# Patient Record
Sex: FEMALE | Race: WHITE | NOT HISPANIC OR LATINO | Employment: FULL TIME | URBAN - METROPOLITAN AREA
[De-identification: names, ages, dates, MRNs, and addresses within clinical notes are randomized per-mention and may not be internally consistent; named-entity substitution may affect disease eponyms.]

---

## 2017-01-04 ENCOUNTER — ROUTINE PRENATAL (OUTPATIENT)
Dept: OBGYN | Facility: CLINIC | Age: 27
End: 2017-01-04
Payer: MEDICAID

## 2017-01-04 VITALS — BODY MASS INDEX: 19.38 KG/M2 | SYSTOLIC BLOOD PRESSURE: 100 MMHG | DIASTOLIC BLOOD PRESSURE: 60 MMHG | WEIGHT: 106 LBS

## 2017-01-04 DIAGNOSIS — Z34.80 SUPERVISION OF OTHER NORMAL PREGNANCY, ANTEPARTUM: ICD-10-CM

## 2017-01-04 PROCEDURE — 90040 PR PRENATAL FOLLOW UP: CPT | Performed by: NURSE PRACTITIONER

## 2017-01-04 NOTE — PROGRESS NOTES
OB f/u. + fetal movement.  No VB, LOF or UC's.  Wt: 106lb      BP: 100/60  Good phone # [phone]  Preferred pharmacy confirmed.  US scheduled 1/10/17  PNP, AFP done

## 2017-01-04 NOTE — MR AVS SNAPSHOT
Rosalva Avila Slick   2017 1:15 PM   Routine Prenatal   MRN: [id number]    Department:  [facility]   Dept Phone:  [phone]    Description:  Female : 1990   Provider:  Natty Dickens D.N.P.           Allergies as of 2017     No Known Allergies      Vital Signs     Blood Pressure Weight Last Menstrual Period Smoking Status          100/60 mmHg 48.081 kg (106 lb) 08/15/2016 Never Smoker         Basic Information     Date Of Birth Sex Race Ethnicity Preferred Language    1990 Female White Non- English      Your appointments     Linden 10, 2017 11:00 AM   US PREG 60 with [address] US 1   [facility] IMAGING - ULTRASOUND - [address]    [address]  [place] [id number] [phone]           For [facility] Pregnancy Center patients only: 1. Please arrive 15 min prior to your appointment time. 2. If you're late, you will be rescheduled for the next available appointment. 3. If you need to reschedule your appointment, please call us at [phone] 48 hours prior to your appointment. 4. Do not bring children as they will not be allowed in exam room. 5. Only one family member may be present in room during exam. 6. The exam will be 30-60 minutes depending on the exam ordered by the physician. 7. The sonographer is not allowed to discuss findings during the exam. Your provider will go over the results with you at your next appointment. 8. The purpose of this ultrasound is to determine if baby is healthy. Diagnostic ultrasounds are NOT to determine the gender of the baby. 9. NO photography or video recording is allowed in exam room. 10. NO cell phones allowed in the exam room. INFORMACION SOBRE PEREA ULTRASONIDO 1. Por favor de llegar 15 minutos antes de perea rajiv. 2. Si llega tarde, le tenemos que cambiar la rajiv para otra fecha. 3. Si necesita cambiar perea rajiv, por favor llame 48 horas antes de la rajiv. [phone] 4. Por favor no traer niños. No se permiten en cuarto de  Ultrasonido. 5. Solamente se permite gil persona en el cuarto zhao el examen. 6. El examen dura 30-60 minutos, dependiendo del examen ordenado por el Doctor. 7. El Sonógrafo no está autorizado hablar sobre benson examen. Benson doctor o partera le va explicar los resultados en benson próxima rajiv. 8. El propósito del Ultrasonido es para determinar si benson ermias viene saludable. No es para determinar el sexo de benson ermias. 9. Por favor no fotos o cámaras de grabar. 10. No celulares permitidos en el cuarto de examen.              Problem List              ICD-10-CM Priority Class Noted - Resolved    Supervision of other normal pregnancy, antepartum Z34.80   11/9/2016 - Present      Health Maintenance        Date Due Completion Dates    IMM HEP B VACCINE (1 of 3 - Primary Series) 1990 ---    IMM HEP A VACCINE (1 of 2 - Standard Series) 11/21/1993 ---    IMM HPV VACCINE (1 of 3 - Female 3 Dose Series) 11/21/2003 ---    IMM VARICELLA (CHICKENPOX) VACCINE (1 of 2 - 2 Dose Adolescent Series) 11/21/2005 ---    IMM INFLUENZA (1) 9/1/2016 11/4/2013    PAP SMEAR 11/9/2019 11/9/2016    IMM DTaP/Tdap/Td Vaccine (2 - Td) 11/4/2023 11/4/2013            Current Immunizations     Influenza Vaccine Quad Inj (Pf) 11/4/2013 11:30 AM    Tdap Vaccine 11/4/2013 11:30 AM    Tuberculin Skin Test 3/21/2016      Below and/or attached are the medications your provider expects you to take. Review all of your home medications and newly ordered medications with your provider and/or pharmacist. Follow medication instructions as directed by your provider and/or pharmacist. Please keep your medication list with you and share with your provider. Update the information when medications are discontinued, doses are changed, or new medications (including over-the-counter products) are added; and carry medication information at all times in the event of emergency situations     Allergies:  No Known Allergies          Medications  Valid as of: January 04, 2017 -   1:40 PM    Generic Name Brand Name Tablet Size Instructions for use    Prenatal Multivit-Min-Fe-FA   Take  by mouth.        .                 Medicines prescribed today were sent to:     [facility] [place], [place] - [address] [facility] [address]    [address] [facility] [place] [address]    Phone: [phone] Fax: [phone]    Open 24 Hours?: Yes    [facility] - [place] (), [place] - [address]    [address] () [place] [address]    Phone: [phone] Fax: [phone]    Open 24 Hours?: No      Medication refill instructions:       If your prescription bottle indicates you have medication refills left, it is not necessary to call your provider’s office. Please contact your pharmacy and they will refill your medication.    If your prescription bottle indicates you do not have any refills left, you may request refills at any time through one of the following ways: The online [employer] Reviewed system (except Urgent Care), by calling your provider’s office, or by asking your pharmacy to contact your provider’s office with a refill request. Medication refills are processed only during regular business hours and may not be available until the next business day. Your provider may request additional information or to have a follow-up visit with you prior to refilling your medication.   *Please Note: Medication refills are assigned a new Rx number when refilled electronically. Your pharmacy may indicate that no refills were authorized even though a new prescription for the same medication is available at the pharmacy. Please request the medicine by name with the pharmacy before contacting your provider for a refill.           [facility] Status: Patient Declined

## 2017-01-04 NOTE — PROGRESS NOTES
S) Pt is a 26 y.o.   at 20w2d  gestation. Routine prenatal care today. Patient states is really just starting to feel baby move. States she is smaller than she was at the same gestation with last pregnancy. Very supportive FOB present and involved. 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  Reports positive  fetal movement. Denies cramping, bleeding or leaking of fluid. Denies dysuria. Generally feels well today. Good self-care activities identified. Denies headaches.     O) see flow         Filed Vitals:    17 1328   BP: 100/60   Weight: 48.081 kg (106 lb)           Lab: normal prenatal panel, normal AFP       Pertinent ultrasound - scheduled.            A) IUP at 20w2d       S=D         Patient Active Problem List    Diagnosis Date Noted   • Supervision of other normal pregnancy, antepartum 2016     P) s/s ptl vs general discomforts. Fetal movements reviewed. General ed and anticipatory guidance. Nutrition/exercise/vitamin. Plans breast. Keep ultrasound appt. Safe work practices discussed. Flu shot recommended. Continue PNV.

## 2017-01-10 ENCOUNTER — DATING (OUTPATIENT)
Dept: OBGYN | Facility: CLINIC | Age: 27
End: 2017-01-10

## 2017-01-10 ENCOUNTER — HOSPITAL ENCOUNTER (OUTPATIENT)
Dept: RADIOLOGY | Facility: MEDICAL CENTER | Age: 27
End: 2017-01-10
Attending: NURSE PRACTITIONER
Payer: MEDICAID

## 2017-01-10 DIAGNOSIS — Z34.80 SUPERVISION OF OTHER NORMAL PREGNANCY, ANTEPARTUM: ICD-10-CM

## 2017-01-10 PROCEDURE — 76805 OB US >/= 14 WKS SNGL FETUS: CPT

## 2017-01-31 ENCOUNTER — ROUTINE PRENATAL (OUTPATIENT)
Dept: OBGYN | Facility: CLINIC | Age: 27
End: 2017-01-31
Payer: MEDICAID

## 2017-01-31 VITALS — WEIGHT: 110 LBS | BODY MASS INDEX: 20.11 KG/M2 | DIASTOLIC BLOOD PRESSURE: 44 MMHG | SYSTOLIC BLOOD PRESSURE: 96 MMHG

## 2017-01-31 DIAGNOSIS — Z34.80 SUPERVISION OF OTHER NORMAL PREGNANCY, ANTEPARTUM: ICD-10-CM

## 2017-01-31 PROCEDURE — 90040 PR PRENATAL FOLLOW UP: CPT | Performed by: NURSE PRACTITIONER

## 2017-01-31 NOTE — MR AVS SNAPSHOT
Rosalva Kruger   2017 1:30 PM   Routine Prenatal   MRN: [id number]    Department:  [facility]   Dept Phone:  [phone]    Description:  Female : 1990   Provider:  Natty Dickens D.N.P.           Allergies as of 2017     No Known Allergies      You were diagnosed with     Supervision of other normal pregnancy, antepartum   [[id number]]         Vital Signs     Blood Pressure Weight Last Menstrual Period Smoking Status          96/44 mmHg 49.896 kg (110 lb) 08/15/2016 Never Smoker         Basic Information     Date Of Birth Sex Race Ethnicity Preferred Language    1990 Female White Non- English      Problem List              ICD-10-CM Priority Class Noted - Resolved    Supervision of other normal pregnancy, antepartum Z34.80   2016 - Present      Health Maintenance        Date Due Completion Dates    IMM HEP B VACCINE (1 of 3 - Primary Series) 1990 ---    IMM HEP A VACCINE (1 of 2 - Standard Series) 1993 ---    IMM HPV VACCINE (1 of 3 - Female 3 Dose Series) 2003 ---    IMM VARICELLA (CHICKENPOX) VACCINE (1 of 2 - 2 Dose Adolescent Series) 2005 ---    IMM INFLUENZA (1) 2016    PAP SMEAR 2019    IMM DTaP/Tdap/Td Vaccine (2 - Td) 2023            Current Immunizations     Influenza Vaccine Quad Inj (Pf) 2013 11:30 AM    Tdap Vaccine 2013 11:30 AM    Tuberculin Skin Test 3/21/2016      Below and/or attached are the medications your provider expects you to take. Review all of your home medications and newly ordered medications with your provider and/or pharmacist. Follow medication instructions as directed by your provider and/or pharmacist. Please keep your medication list with you and share with your provider. Update the information when medications are discontinued, doses are changed, or new medications (including over-the-counter products) are added; and carry medication information at  all times in the event of emergency situations     Allergies:  No Known Allergies          Medications  Valid as of: January 31, 2017 -  1:58 PM    Generic Name Brand Name Tablet Size Instructions for use    Prenatal Multivit-Min-Fe-FA   Take  by mouth.        .                 Medicines prescribed today were sent to:     [facility] [address] [place] [address] (), [place] - [address]    [address] () [place] [address]    Phone: [phone] Fax: [phone]    Open 24 Hours?: No      Medication refill instructions:       If your prescription bottle indicates you have medication refills left, it is not necessary to call your provider’s office. Please contact your pharmacy and they will refill your medication.    If your prescription bottle indicates you do not have any refills left, you may request refills at any time through one of the following ways: The online [employer] system (except Urgent Care), by calling your provider’s office, or by asking your pharmacy to contact your provider’s office with a refill request. Medication refills are processed only during regular business hours and may not be available until the next business day. Your provider may request additional information or to have a follow-up visit with you prior to refilling your medication.   *Please Note: Medication refills are assigned a new Rx number when refilled electronically. Your pharmacy may indicate that no refills were authorized even though a new prescription for the same medication is available at the pharmacy. Please request the medicine by name with the pharmacy before contacting your provider for a refill.        Your To Do List     Future Labs/Procedures Complete By Expires    GLUCOSE 1HR GESTATIONAL  As directed 2/1/2018    HCT  As directed 2/1/2018    HGB  As directed 2/1/2018    T.PALLIDUM AB EIA  As directed 2/1/2018         [employer] Status: Patient Declined

## 2017-01-31 NOTE — PROGRESS NOTES
Pt here today for OB follow up  Reports +FM  WT: 110 lb  BP: 96/44  Pt states no complications today  1 hr gtt, H/H, and T.Pallidum lab slip given today with instructions.   Good # [phone]

## 2017-01-31 NOTE — PROGRESS NOTES
S) Pt is a 26 y.o.   at 24w1d  gestation. Routine prenatal care today. States no problems today. Works at a . Supportive FOB present and involved.  Reports good  fetal movement. Denies cramping, bleeding or leaking of fluid. Denies dysuria. Generally feels well today. Good self-care activities identified. Denies headaches.     O) see flow         Filed Vitals:    17 1340   BP: 96/44   Weight: 49.896 kg (110 lb)           Lab: Normal prenatal panel.        Pertinent ultrasound -        Normal fetal survey     A) IUP at 24w1d       S=D         Patient Active Problem List    Diagnosis Date Noted   • Supervision of other normal pregnancy, antepartum 2016     P) s/s ptl vs general discomforts. Fetal movements reviewed. General ed and anticipatory guidance. Nutrition/exercise/vitamin. Plans breast. Continue PNV.

## 2017-02-08 ENCOUNTER — HOSPITAL ENCOUNTER (OUTPATIENT)
Dept: LAB | Facility: MEDICAL CENTER | Age: 27
End: 2017-02-08
Attending: NURSE PRACTITIONER
Payer: MEDICAID

## 2017-02-08 DIAGNOSIS — Z34.80 SUPERVISION OF OTHER NORMAL PREGNANCY, ANTEPARTUM: ICD-10-CM

## 2017-02-08 LAB
GLUCOSE 1H P 50 G GLC PO SERPL-MCNC: 98 MG/DL (ref 70–139)
HCT VFR BLD AUTO: 40.1 % (ref 37–47)
HGB BLD-MCNC: 13.1 G/DL (ref 12–16)
TREPONEMA PALLIDUM IGG+IGM AB [PRESENCE] IN SERUM OR PLASMA BY IMMUNOASSAY: NON REACTIVE

## 2017-02-08 PROCEDURE — 36415 COLL VENOUS BLD VENIPUNCTURE: CPT

## 2017-02-08 PROCEDURE — 82950 GLUCOSE TEST: CPT

## 2017-02-08 PROCEDURE — 86780 TREPONEMA PALLIDUM: CPT

## 2017-02-08 PROCEDURE — 85014 HEMATOCRIT: CPT

## 2017-02-08 PROCEDURE — 85018 HEMOGLOBIN: CPT

## 2017-02-28 ENCOUNTER — ROUTINE PRENATAL (OUTPATIENT)
Dept: OBGYN | Facility: CLINIC | Age: 27
End: 2017-02-28
Payer: MEDICAID

## 2017-02-28 VITALS — DIASTOLIC BLOOD PRESSURE: 56 MMHG | BODY MASS INDEX: 21.21 KG/M2 | SYSTOLIC BLOOD PRESSURE: 102 MMHG | WEIGHT: 116 LBS

## 2017-02-28 DIAGNOSIS — Z34.83 PRENATAL CARE, SUBSEQUENT PREGNANCY, THIRD TRIMESTER: ICD-10-CM

## 2017-02-28 DIAGNOSIS — O26.843 UTERINE SIZE DATE DISCREPANCY, THIRD TRIMESTER: ICD-10-CM

## 2017-02-28 PROCEDURE — 90471 IMMUNIZATION ADMIN: CPT | Performed by: NURSE PRACTITIONER

## 2017-02-28 PROCEDURE — 90715 TDAP VACCINE 7 YRS/> IM: CPT | Performed by: NURSE PRACTITIONER

## 2017-02-28 PROCEDURE — 90040 PR PRENATAL FOLLOW UP: CPT | Performed by: NURSE PRACTITIONER

## 2017-02-28 NOTE — Clinical Note
"Count Your Baby's Movements  Another step to a healthy delivery    Rosalva Long             Dept: [phone]    How Many Weeks Pregnant? 28w1d    Date to Begin Countin17              How to use this chart    One way for your physician to keep track of your baby's health is by knowing how often the baby moves (or \"kicks\") in your womb.  You can help your physician to do this by using this chart every day.    Every day, you should see how many hours it takes for your baby to move 10 times.  Start in the morning, as soon as you get up.    · First, write down the time your baby moves until you get to 10.  · Check off one box every time your baby moves until you get to 10.  · Write down the time you finished counting in the last column.  · Total how long it took to count up all 10 movements.  · Finally, fill in the box that shows how long this took.  After counting 10 movements, you no longer have to count any more that day.  The next morning, just start counting again as soon as you get up.    What should you call a \"movement\"?  It is hard to say, because it will feel different from one mother to another and from one pregnancy to the next.  The important thing is that you count the movements the same way throughout your pregnancy.  If you have more questions, you should ask your physician.    Count carefully every day!  SAMPLE:  Week 28    How many hours did it take to feel 10 movements?       Start  Time     1     2     3     4     5     6     7     8     9     10   Finish Time   Mon 8:20 ·  ·  ·  ·  ·  ·  ·  ·  ·  ·  11:40                  Sat               Sun                 IMPORTANT: You should contact your physician if it takes more than two hours for you to feel 10 movements.  Each morning, write down the time and start to count the movements of your baby.  Keep track by checking off one box every time you feel one movement.  When you have felt 10 " "\"kicks\", write down the time you finished counting in the last column.  Then fill in the   box (over the check brigitte) for the number of hours it took.  Be sure to read the complete instructions on the previous page.            "

## 2017-02-28 NOTE — MR AVS SNAPSHOT
Rosalva Kruger   2017 2:00 PM   Routine Prenatal   MRN: [id number]    Department:  [facility]   Dept Phone:  [phone]    Description:  Female : 1990   Provider:  Natty Dickens D.N.P.           Allergies as of 2017     No Known Allergies      You were diagnosed with     Uterine size date discrepancy, third trimester   [[id number]]       Prenatal care, subsequent pregnancy, third trimester   [[id number]]         Vital Signs     Blood Pressure Weight Last Menstrual Period Smoking Status          102/56 mmHg 52.617 kg (116 lb) 08/15/2016 Never Smoker         Basic Information     Date Of Birth Sex Race Ethnicity Preferred Language    1990 Female White Non- English      Problem List              ICD-10-CM Priority Class Noted - Resolved    Supervision of other normal pregnancy, antepartum Z34.80   2016 - Present      Health Maintenance        Date Due Completion Dates    IMM HEP B VACCINE (1 of 3 - Primary Series) 1990 ---    IMM HEP A VACCINE (1 of 2 - Standard Series) 1993 ---    IMM HPV VACCINE (1 of 3 - Female 3 Dose Series) 2003 ---    IMM VARICELLA (CHICKENPOX) VACCINE (1 of 2 - 2 Dose Adolescent Series) 2005 ---    IMM INFLUENZA (1) 2016    PAP SMEAR 2019    IMM DTaP/Tdap/Td Vaccine (2 - Td) 2023            Current Immunizations     Influenza Vaccine Quad Inj (Pf) 2013 11:30 AM    Tdap Vaccine  Incomplete, 2013 11:30 AM    Tuberculin Skin Test 3/21/2016      Below and/or attached are the medications your provider expects you to take. Review all of your home medications and newly ordered medications with your provider and/or pharmacist. Follow medication instructions as directed by your provider and/or pharmacist. Please keep your medication list with you and share with your provider. Update the information when medications are discontinued, doses are changed, or new medications  (including over-the-counter products) are added; and carry medication information at all times in the event of emergency situations     Allergies:  No Known Allergies          Medications  Valid as of: February 28, 2017 -  2:26 PM    Generic Name Brand Name Tablet Size Instructions for use    Prenatal Multivit-Min-Fe-FA   Take  by mouth.        .                 Medicines prescribed today were sent to:     [facility] [address] [place] [address] (), [place] - [address]    [address] () [place] [address]    Phone: [phone] Fax: [phone]    Open 24 Hours?: No      Medication refill instructions:       If your prescription bottle indicates you have medication refills left, it is not necessary to call your provider’s office. Please contact your pharmacy and they will refill your medication.    If your prescription bottle indicates you do not have any refills left, you may request refills at any time through one of the following ways: The online [employer] system (except Urgent Care), by calling your provider’s office, or by asking your pharmacy to contact your provider’s office with a refill request. Medication refills are processed only during regular business hours and may not be available until the next business day. Your provider may request additional information or to have a follow-up visit with you prior to refilling your medication.   *Please Note: Medication refills are assigned a new Rx number when refilled electronically. Your pharmacy may indicate that no refills were authorized even though a new prescription for the same medication is available at the pharmacy. Please request the medicine by name with the pharmacy before contacting your provider for a refill.        Your To Do List     Future Labs/Procedures Complete By Expires    US-OB LIMITED GROWTH FOLLOW UP  As directed 2/28/2018         [employer] Status: Patient Declined

## 2017-02-28 NOTE — PROGRESS NOTES
Pt here today for OB follow up  Pt states no complaints.   Reports +FM  WT:116lb  BP: 102/56  Good # [phone]  Pt given brooks sheet and instructions.   Pt would like Tdap  Tdap vaccine given. right Deltoid. VIS given and screening check list reviewed with pt.  Pt declines BTL.

## 2017-02-28 NOTE — PROGRESS NOTES
S) Pt is a 26 y.o.   at 28w1d  gestation. Routine prenatal care today. States no specific problems today.  Reports good  fetal movement. Denies cramping, bleeding or leaking of fluid. Denies dysuria. Generally feels well today. Good self-care activities identified. Denies headaches.     O) see flow         Filed Vitals:    17 1409   BP: 102/56   Weight: 52.617 kg (116 lb)           Lab: normal prenatal panel, normal glucose       Pertinent ultrasound - normal fetal survey          A) IUP at 28w1d       S=D is questionable patient measures 26 today.         Patient Active Problem List    Diagnosis Date Noted   • Supervision of other normal pregnancy, antepartum 2016       P) s/s ptl vs general discomforts. Fetal movements reviewed. General ed and anticipatory guidance. Nutrition/exercise/vitamin. Plans breast. Continue PNV. Growth scan in 2 weeks. TDAP today.

## 2017-03-14 ENCOUNTER — ROUTINE PRENATAL (OUTPATIENT)
Dept: OBGYN | Facility: CLINIC | Age: 27
End: 2017-03-14
Payer: MEDICAID

## 2017-03-14 VITALS — SYSTOLIC BLOOD PRESSURE: 100 MMHG | DIASTOLIC BLOOD PRESSURE: 58 MMHG

## 2017-03-14 DIAGNOSIS — Z3A.30 30 WEEKS GESTATION OF PREGNANCY: ICD-10-CM

## 2017-03-14 PROCEDURE — 90040 PR PRENATAL FOLLOW UP: CPT | Performed by: NURSE PRACTITIONER

## 2017-03-14 NOTE — MR AVS SNAPSHOT
Rosalva Kruger   3/14/2017 1:00 PM   Routine Prenatal   MRN: [id number]    Department:  [facility]   Dept Phone:  [phone]    Description:  Female : 1990   Provider:  DEBRA Squires           Allergies as of 3/14/2017     No Known Allergies      You were diagnosed with     30 weeks gestation of pregnancy   [[id number]]         Vital Signs     Blood Pressure Last Menstrual Period Smoking Status             100/58 mmHg 08/15/2016 Never Smoker          Basic Information     Date Of Birth Sex Race Ethnicity Preferred Language    1990 Female White Non- English      Your appointments     Mar 15, 2017  3:30 PM   US PREG 30 with PREG CTR US 1   [facility] ([facility])    [facility]  [address] [id number] [phone]           For [facility] patients only: 1. Please arrive 15 min prior to your appointment time. 2. If you're late, you will be rescheduled for the next available appointment. 3. If you need to reschedule your appointment, please call us at [phone] 48 hours prior to your appointment. 4. Do not bring children as they will not be allowed in exam room. 5. Only one family member may be present in room during exam. 6. The exam will be 30-60 minutes depending on the exam ordered by the physician. 7. The sonographer is not allowed to discuss findings during the exam. Your provider will go over the results with you at your next appointment. 8. The purpose of this ultrasound is to determine if baby is healthy. Diagnostic ultrasounds are NOT to determine the gender of the baby. 9. NO photography or video recording is allowed in exam room. 10. NO cell phones allowed in the exam room. INFORMACION SOBRE BENSON ULTRASONIDO 1. Por favor de llegar 15 minutos antes de benson rajiv. 2. Si llega tarde, le tenemos que cambiar la arjiv para otra fecha. 3. Si necesita cambiar benson rajiv, por favor llame 48  horas antes de la rajiv. [phone] 4. Por favor no traer niños. No se permiten en cuarto de Ultrasonido. 5. Solamente se permite gil persona en el cuarto zhao el examen. 6. El examen dura 30-60 minutos, dependiendo del examen ordenado por el Doctor. 7. El Sonógrafo no está autorizado hablar sobre benson examen. Benson doctor o partera le va explicar los resultados en benson próxima rajiv. 8. El propósito del Ultrasonido es para determinar si benson ermias viene saludable. No es para determinar el sexo de benson ermias. 9. Por favor no fotos o cámaras de grabar. 10. No celulares permitidos en el cuarto de examen.              Problem List              ICD-10-CM Priority Class Noted - Resolved    Supervision of other normal pregnancy, antepartum Z34.80   11/9/2016 - Present      Health Maintenance        Date Due Completion Dates    IMM HEP B VACCINE (1 of 3 - Primary Series) 1990 ---    IMM HEP A VACCINE (1 of 2 - Standard Series) 11/21/1993 ---    IMM HPV VACCINE (1 of 3 - Female 3 Dose Series) 11/21/2003 ---    IMM VARICELLA (CHICKENPOX) VACCINE (1 of 2 - 2 Dose Adolescent Series) 11/21/2005 ---    IMM INFLUENZA (1) 9/1/2016 11/4/2013    PAP SMEAR 11/9/2019 11/9/2016    IMM DTaP/Tdap/Td Vaccine (3 - Td) 2/28/2027 2/28/2017, 11/4/2013            Current Immunizations     Influenza Vaccine Quad Inj (Pf) 11/4/2013 11:30 AM    Tdap Vaccine 2/28/2017  2:22 PM, 11/4/2013 11:30 AM    Tuberculin Skin Test 3/21/2016      Below and/or attached are the medications your provider expects you to take. Review all of your home medications and newly ordered medications with your provider and/or pharmacist. Follow medication instructions as directed by your provider and/or pharmacist. Please keep your medication list with you and share with your provider. Update the information when medications are discontinued, doses are changed, or new medications (including over-the-counter products) are added; and carry medication information at all times in  the event of emergency situations     Allergies:  No Known Allergies          Medications  Valid as of: March 14, 2017 -  1:19 PM    Generic Name Brand Name Tablet Size Instructions for use    Prenatal Multivit-Min-Fe-FA   Take  by mouth.        .                 Medicines prescribed today were sent to:     [facility] [address] [place] [address] (), [place] - [address]    [address] () [place] [address]    Phone: [phone] Fax: [phone]    Open 24 Hours?: No      Medication refill instructions:       If your prescription bottle indicates you have medication refills left, it is not necessary to call your provider’s office. Please contact your pharmacy and they will refill your medication.    If your prescription bottle indicates you do not have any refills left, you may request refills at any time through one of the following ways: The online [employer] system (except Urgent Care), by calling your provider’s office, or by asking your pharmacy to contact your provider’s office with a refill request. Medication refills are processed only during regular business hours and may not be available until the next business day. Your provider may request additional information or to have a follow-up visit with you prior to refilling your medication.   *Please Note: Medication refills are assigned a new Rx number when refilled electronically. Your pharmacy may indicate that no refills were authorized even though a new prescription for the same medication is available at the pharmacy. Please request the medicine by name with the pharmacy before contacting your provider for a refill.           quietrevolutionhart Status: Patient Declined

## 2017-03-14 NOTE — PROGRESS NOTES
Reviewed how to be more aware of baby's movement.  Continue daily kick counts.  S&S of  labor reviewed.

## 2017-03-14 NOTE — PROGRESS NOTES
Pt here today for OB follow up  Pt states babys movements were slowing down, but normal movements today.   Reports +FM  WT:117lb  BP:100/58  Good # [phone]

## 2017-03-15 ENCOUNTER — APPOINTMENT (OUTPATIENT)
Dept: RADIOLOGY | Facility: IMAGING CENTER | Age: 27
End: 2017-03-15
Attending: NURSE PRACTITIONER
Payer: MEDICAID

## 2017-03-15 DIAGNOSIS — O26.843 UTERINE SIZE DATE DISCREPANCY, THIRD TRIMESTER: ICD-10-CM

## 2017-03-15 PROCEDURE — 76816 OB US FOLLOW-UP PER FETUS: CPT | Mod: 26 | Performed by: OBSTETRICS & GYNECOLOGY

## 2017-03-16 ENCOUNTER — DATING (OUTPATIENT)
Dept: OBGYN | Facility: CLINIC | Age: 27
End: 2017-03-16

## 2017-03-29 ENCOUNTER — ROUTINE PRENATAL (OUTPATIENT)
Dept: OBGYN | Facility: CLINIC | Age: 27
End: 2017-03-29
Payer: MEDICAID

## 2017-03-29 VITALS — BODY MASS INDEX: 21.94 KG/M2 | SYSTOLIC BLOOD PRESSURE: 104 MMHG | DIASTOLIC BLOOD PRESSURE: 58 MMHG | WEIGHT: 120 LBS

## 2017-03-29 DIAGNOSIS — Z34.80 SUPERVISION OF OTHER NORMAL PREGNANCY, ANTEPARTUM: Primary | ICD-10-CM

## 2017-03-29 PROCEDURE — 90040 PR PRENATAL FOLLOW UP: CPT | Performed by: NURSE PRACTITIONER

## 2017-03-29 NOTE — PROGRESS NOTES
S:  Pt is  at 32w2d for routine OB follow up.  No c/o.  Reports good FM.  Denies VB, LOF, RUCs or vaginal DC.    O:  Please see above vitals.        FHTs: 140        Fundal ht: 31 cm.    Limited OB US     3/15/2017 3:21 PM    HISTORY/REASON FOR EXAM:  Size less than dates, evaluate fetal growth    TECHNIQUE/EXAM DESCRIPTION: OB limited ultrasound.    COMPARISON:  Obstetrical ultrasound 1/10/2017    FINDINGS:  Fetal Lie:  Vertex  LMP:  8/15/2016  Clinical RIGOBERTO by LMP:  2017    Placenta (Location):  Anterior  Placenta Previa: No    Amniotic Fluid Volume:  RAMON = 12.18 cm    Fetal Heart Rate:  133 bpm    Cervical Length:  3.95 cm    No maternal adnexal mass is identified.    Fetal Biometry  BPD    7.62 cm, 30 weeks, 4 days  HC    27.93 cm, 30 weeks, 4 days  AC    23.70 cm, 28 weeks  Femur Length    5.61 cm, 29 weeks, 4 days  Humerus Length    4.95 cm, 29 weeks, 1 day    EGA by this US:  29 weeks, 4 days  RIGOBERTO by this US: 2017  RIGOBERTO by 1st US:  2017    Estimated Fetal Weight:  1307 grams    Comments:         Impression        1.  Single intrauterine pregnancy of an estimated gestational age of 29 weeks, 4 days with an estimated date of delivery of 2017 which is within one week of the clinical dates.         A:  IUP at 32w2d  Patient Active Problem List    Diagnosis Date Noted   • Supervision of other normal pregnancy, antepartum 2016        P:  1.  GBS @ 35 wks.          2.  Continue FKCs.          3.  Questions answered.          4.  Encouraged pt to tour L&D.          5.  Encourage adequate water intake.        6.  F/u 2 wks.        7.  FYI: none.          8.  US results reviewed w pt.

## 2017-03-29 NOTE — PROGRESS NOTES
Pt here today for OB follow up  Reports +FM  Denies any complaints  WT:120lb  BP:104/58  Good # [phone]

## 2017-03-29 NOTE — MR AVS SNAPSHOT
Rosalva Kruger   3/29/2017 2:00 PM   Routine Prenatal   MRN: [id number]    Department:  [facility]   Dept Phone:  [phone]    Description:  Female : 1990   Provider:  Corinne Ware C.N.M.           Allergies as of 3/29/2017     No Known Allergies      You were diagnosed with     Supervision of other normal pregnancy, antepartum   [[id number]]  -  Primary       Vital Signs     Blood Pressure Weight Last Menstrual Period Smoking Status          104/58 mmHg 54.432 kg (120 lb) 08/15/2016 Never Smoker         Basic Information     Date Of Birth Sex Race Ethnicity Preferred Language    1990 Female White Non- English      Problem List              ICD-10-CM Priority Class Noted - Resolved    Supervision of other normal pregnancy, antepartum Z34.80   2016 - Present      Health Maintenance        Date Due Completion Dates    IMM HEP B VACCINE (1 of 3 - Primary Series) 1990 ---    IMM HEP A VACCINE (1 of 2 - Standard Series) 1993 ---    IMM HPV VACCINE (1 of 3 - Female 3 Dose Series) 2003 ---    IMM VARICELLA (CHICKENPOX) VACCINE (1 of 2 - 2 Dose Adolescent Series) 2005 ---    IMM INFLUENZA (1) 2016    PAP SMEAR 2019    IMM DTaP/Tdap/Td Vaccine (3 - Td) 2027, 2013            Current Immunizations     Influenza Vaccine Quad Inj (Pf) 2013 11:30 AM    Tdap Vaccine 2017  2:22 PM, 2013 11:30 AM    Tuberculin Skin Test 3/21/2016      Below and/or attached are the medications your provider expects you to take. Review all of your home medications and newly ordered medications with your provider and/or pharmacist. Follow medication instructions as directed by your provider and/or pharmacist. Please keep your medication list with you and share with your provider. Update the information when medications are discontinued, doses are changed, or new medications (including over-the-counter products) are  added; and carry medication information at all times in the event of emergency situations     Allergies:  No Known Allergies          Medications  Valid as of: March 29, 2017 -  2:15 PM    Generic Name Brand Name Tablet Size Instructions for use    Prenatal Multivit-Min-Fe-FA   Take  by mouth.        .                 Medicines prescribed today were sent to:     [facility] [address] [place] [address] (), [place] - [address]    [address] () [place] [address]    Phone: [phone] Fax: [phone]    Open 24 Hours?: No      Medication refill instructions:       If your prescription bottle indicates you have medication refills left, it is not necessary to call your provider’s office. Please contact your pharmacy and they will refill your medication.    If your prescription bottle indicates you do not have any refills left, you may request refills at any time through one of the following ways: The online [employer] system (except Urgent Care), by calling your provider’s office, or by asking your pharmacy to contact your provider’s office with a refill request. Medication refills are processed only during regular business hours and may not be available until the next business day. Your provider may request additional information or to have a follow-up visit with you prior to refilling your medication.   *Please Note: Medication refills are assigned a new Rx number when refilled electronically. Your pharmacy may indicate that no refills were authorized even though a new prescription for the same medication is available at the pharmacy. Please request the medicine by name with the pharmacy before contacting your provider for a refill.        Instructions    P:  1.  GBS @ 35 wks.          2.  Continue FKCs.          3.  Questions answered.          4.  Encouraged pt to tour L&D.          5.  Encourage adequate water intake.        6.  F/u 2 wks.        7.  FYI: none.          8.  US results reviewed w pt.       Other  Notes About Your Plan     Baby Girl           MyChart Status: Patient Declined

## 2017-04-12 ENCOUNTER — ROUTINE PRENATAL (OUTPATIENT)
Dept: OBGYN | Facility: CLINIC | Age: 27
End: 2017-04-12
Payer: MEDICAID

## 2017-04-12 VITALS — BODY MASS INDEX: 22.31 KG/M2 | WEIGHT: 122 LBS | SYSTOLIC BLOOD PRESSURE: 98 MMHG | DIASTOLIC BLOOD PRESSURE: 56 MMHG

## 2017-04-12 DIAGNOSIS — Z3A.34 34 WEEKS GESTATION OF PREGNANCY: ICD-10-CM

## 2017-04-12 PROCEDURE — 90040 PR PRENATAL FOLLOW UP: CPT | Performed by: NURSE PRACTITIONER

## 2017-04-12 NOTE — MR AVS SNAPSHOT
Rosalva Kruger   2017 3:15 PM   Routine Prenatal   MRN: [id number]    Department:  [facility]   Dept Phone:  [phone]    Description:  Female : 1990   Provider:  DEBRA Squires           Allergies as of 2017     No Known Allergies      You were diagnosed with     34 weeks gestation of pregnancy   [[id number]]         Vital Signs     Blood Pressure Weight Last Menstrual Period Smoking Status          98/56 mmHg 55.339 kg (122 lb) 08/15/2016 Never Smoker         Basic Information     Date Of Birth Sex Race Ethnicity Preferred Language    1990 Female White Non- English      Problem List              ICD-10-CM Priority Class Noted - Resolved    Supervision of other normal pregnancy, antepartum Z34.80   2016 - Present      Health Maintenance        Date Due Completion Dates    IMM HEP B VACCINE (1 of 3 - Primary Series) 1990 ---    IMM HEP A VACCINE (1 of 2 - Standard Series) 1993 ---    IMM HPV VACCINE (1 of 3 - Female 3 Dose Series) 2003 ---    IMM VARICELLA (CHICKENPOX) VACCINE (1 of 2 - 2 Dose Adolescent Series) 2005 ---    PAP SMEAR 2019    IMM DTaP/Tdap/Td Vaccine (3 - Td) 2027, 2013            Current Immunizations     Influenza Vaccine Quad Inj (Pf) 2013 11:30 AM    Tdap Vaccine 2017  2:22 PM, 2013 11:30 AM    Tuberculin Skin Test 3/21/2016      Below and/or attached are the medications your provider expects you to take. Review all of your home medications and newly ordered medications with your provider and/or pharmacist. Follow medication instructions as directed by your provider and/or pharmacist. Please keep your medication list with you and share with your provider. Update the information when medications are discontinued, doses are changed, or new medications (including over-the-counter products) are added; and carry medication information at all times in the event of  emergency situations     Allergies:  No Known Allergies          Medications  Valid as of: April 12, 2017 -  3:17 PM    Generic Name Brand Name Tablet Size Instructions for use    Prenatal Multivit-Min-Fe-FA   Take  by mouth.        .                 Medicines prescribed today were sent to:     [facility] [address] [place] [address] (), [place] - [address]    [address] () [place] [address]    Phone: [phone] Fax: [phone]    Open 24 Hours?: No      Medication refill instructions:       If your prescription bottle indicates you have medication refills left, it is not necessary to call your provider’s office. Please contact your pharmacy and they will refill your medication.    If your prescription bottle indicates you do not have any refills left, you may request refills at any time through one of the following ways: The online [employer] system (except Urgent Care), by calling your provider’s office, or by asking your pharmacy to contact your provider’s office with a refill request. Medication refills are processed only during regular business hours and may not be available until the next business day. Your provider may request additional information or to have a follow-up visit with you prior to refilling your medication.   *Please Note: Medication refills are assigned a new Rx number when refilled electronically. Your pharmacy may indicate that no refills were authorized even though a new prescription for the same medication is available at the pharmacy. Please request the medicine by name with the pharmacy before contacting your provider for a refill.        Other Notes About Your Plan     Baby Girl           MyChart Status: Patient Declined

## 2017-04-12 NOTE — PROGRESS NOTES
Pt here today for OB follow up  Reports +FM  WT: 122 lb  BP: 98/56  Pt states  No complaints today  Good # [phone]

## 2017-04-21 ENCOUNTER — HOSPITAL ENCOUNTER (OUTPATIENT)
Facility: MEDICAL CENTER | Age: 27
End: 2017-04-21
Attending: OBSTETRICS & GYNECOLOGY | Admitting: OBSTETRICS & GYNECOLOGY
Payer: MEDICAID

## 2017-04-21 VITALS
TEMPERATURE: 98.3 F | WEIGHT: 120 LBS | BODY MASS INDEX: 22.08 KG/M2 | SYSTOLIC BLOOD PRESSURE: 124 MMHG | DIASTOLIC BLOOD PRESSURE: 78 MMHG | HEART RATE: 108 BPM | HEIGHT: 62 IN

## 2017-04-21 PROCEDURE — 59025 FETAL NON-STRESS TEST: CPT | Performed by: NURSE PRACTITIONER

## 2017-04-22 NOTE — PROGRESS NOTES
Pt is a 27 yo  with an EDC of 17 making her 35w4d today here for constant low back pain that began yesterday evening.  Pt reports + FM and denies LOF or vaginal bleeding.  SVE done - cervix fingertip and very posterior.  FHT reactive and reassuring.      TOCO shows irritability and pt states has been working hard today at her job - also has been constipated for the last few days (last BM today but very hard stool was noted).  Encouraged to rest, hydrate, and eat small meals throughout the day. Pt may take tylenol for pain, use heat, massage and pillows for positioning.  Next OB appointment scheduled for    DEBORAH Nunez notified. Discharge orders received.      Discharge instructions reviewed with pt.  Pt stated understanding.     - pt left with belongings and FOB

## 2017-04-25 ENCOUNTER — ROUTINE PRENATAL (OUTPATIENT)
Dept: OBGYN | Facility: CLINIC | Age: 27
End: 2017-04-25
Payer: MEDICAID

## 2017-04-25 ENCOUNTER — HOSPITAL ENCOUNTER (OUTPATIENT)
Facility: MEDICAL CENTER | Age: 27
End: 2017-04-25
Attending: NURSE PRACTITIONER
Payer: MEDICAID

## 2017-04-25 VITALS — SYSTOLIC BLOOD PRESSURE: 98 MMHG | WEIGHT: 125 LBS | BODY MASS INDEX: 22.86 KG/M2 | DIASTOLIC BLOOD PRESSURE: 60 MMHG

## 2017-04-25 DIAGNOSIS — Z3A.36 36 WEEKS GESTATION OF PREGNANCY: ICD-10-CM

## 2017-04-25 PROCEDURE — 87653 STREP B DNA AMP PROBE: CPT

## 2017-04-25 PROCEDURE — 90040 PR PRENATAL FOLLOW UP: CPT | Performed by: NURSE PRACTITIONER

## 2017-04-25 NOTE — MR AVS SNAPSHOT
Rosalva Kruger   2017 3:30 PM   Routine Prenatal   MRN: [id number]    Department:  [facility]   Dept Phone:  [phone]    Description:  Female : 1990   Provider:  DEBRA Squires           Allergies as of 2017     No Known Allergies      You were diagnosed with     36 weeks gestation of pregnancy   [[id number]]         Vital Signs     Blood Pressure Weight Last Menstrual Period Smoking Status          98/60 mmHg 56.7 kg (125 lb) 08/15/2016 Never Smoker         Basic Information     Date Of Birth Sex Race Ethnicity Preferred Language    1990 Female White Non- English      Problem List              ICD-10-CM Priority Class Noted - Resolved    Supervision of other normal pregnancy, antepartum Z34.80   2016 - Present      Health Maintenance        Date Due Completion Dates    IMM HEP B VACCINE (1 of 3 - Primary Series) 1990 ---    IMM HEP A VACCINE (1 of 2 - Standard Series) 1993 ---    IMM HPV VACCINE (1 of 3 - Female 3 Dose Series) 2003 ---    IMM VARICELLA (CHICKENPOX) VACCINE (1 of 2 - 2 Dose Adolescent Series) 2005 ---    PAP SMEAR 2019    IMM DTaP/Tdap/Td Vaccine (3 - Td) 2027, 2013            Current Immunizations     Influenza Vaccine Quad Inj (Pf) 2013 11:30 AM    Tdap Vaccine 2017  2:22 PM, 2013 11:30 AM    Tuberculin Skin Test 3/21/2016      Below and/or attached are the medications your provider expects you to take. Review all of your home medications and newly ordered medications with your provider and/or pharmacist. Follow medication instructions as directed by your provider and/or pharmacist. Please keep your medication list with you and share with your provider. Update the information when medications are discontinued, doses are changed, or new medications (including over-the-counter products) are added; and carry medication information at all times in the event of  emergency situations     Allergies:  No Known Allergies          Medications  Valid as of: April 25, 2017 -  4:18 PM    Generic Name Brand Name Tablet Size Instructions for use    Prenatal Multivit-Min-Fe-FA   Take  by mouth.        .                 Medicines prescribed today were sent to:     [facility] [address] [place] [address] (), [place] - [address]    [address] () [place] [address]    Phone: [phone] Fax: [phone]    Open 24 Hours?: No      Medication refill instructions:       If your prescription bottle indicates you have medication refills left, it is not necessary to call your provider’s office. Please contact your pharmacy and they will refill your medication.    If your prescription bottle indicates you do not have any refills left, you may request refills at any time through one of the following ways: The online [employer] system (except Urgent Care), by calling your provider’s office, or by asking your pharmacy to contact your provider’s office with a refill request. Medication refills are processed only during regular business hours and may not be available until the next business day. Your provider may request additional information or to have a follow-up visit with you prior to refilling your medication.   *Please Note: Medication refills are assigned a new Rx number when refilled electronically. Your pharmacy may indicate that no refills were authorized even though a new prescription for the same medication is available at the pharmacy. Please request the medicine by name with the pharmacy before contacting your provider for a refill.        Your To Do List     Future Labs/Procedures Complete By Expires    GRP B STREP, BY PCR (JONES BROTH)  As directed 4/26/2018    Comments:    SOURCE: VAGINAL and RECTAL      Other Notes About Your Plan     Baby Girl           MyChart Status: Patient Declined

## 2017-04-25 NOTE — PROGRESS NOTES
"Pt here today for OB follow up  GBS to be done today  Reports +FM  WT: 125 lb  BP: 98/60   Temp: 99.5  Pt states was seen at [facility] L&D last Friday due to \"major back pain and mild cramps\". States having cold allergies.   Good # [phone]    "

## 2017-04-25 NOTE — PROGRESS NOTES
GBS today.  Ears and throat clear.  Lungs clear.  Probably has discomfort secondary to seasonal allergies.  Comfort measures reviewed.

## 2017-04-27 LAB — GP B STREP DNA SPEC QL NAA+PROBE: POSITIVE

## 2017-04-28 PROBLEM — B95.1 GROUP BETA STREP POSITIVE: Status: ACTIVE | Noted: 2017-04-28

## 2017-05-02 ENCOUNTER — ROUTINE PRENATAL (OUTPATIENT)
Dept: OBGYN | Facility: CLINIC | Age: 27
End: 2017-05-02
Payer: MEDICAID

## 2017-05-02 VITALS — SYSTOLIC BLOOD PRESSURE: 106 MMHG | WEIGHT: 126 LBS | DIASTOLIC BLOOD PRESSURE: 66 MMHG | BODY MASS INDEX: 23.04 KG/M2

## 2017-05-02 DIAGNOSIS — Z34.83 ENCOUNTER FOR SUPERVISION OF OTHER NORMAL PREGNANCY, THIRD TRIMESTER: Primary | ICD-10-CM

## 2017-05-02 DIAGNOSIS — B95.1 GROUP BETA STREP POSITIVE: ICD-10-CM

## 2017-05-02 PROCEDURE — 90040 PR PRENATAL FOLLOW UP: CPT | Performed by: NURSE PRACTITIONER

## 2017-05-02 NOTE — PROGRESS NOTES
Pt here today for OB follow up  Pt states no complaints   Reports +FM  WT:126lb  BP:106/66  Good # [phone]  GBS POSITIVE

## 2017-05-02 NOTE — PROGRESS NOTES
S) Pt is a 26 y.o.   at 37w1d  gestation. Routine prenatal care today. No complaints. Reports good fetal movement. Denies cramping, bleeding or leaking of fluid. Denies dysuria. Generally feels well today. Good self-care activities identified. Denies headaches, visual changes, epigastric pain, or swelling.     O) see flow         Filed Vitals:    17 1353   BP: 106/66   Weight: 57.153 kg (126 lb)           Lab:  Recent Results (from the past 336 hour(s))   GRP B STREP, BY PCR (JONES BROTH)    Collection Time: 17  4:56 PM   Result Value Ref Range    Strep Gp B DNA PCR POSITIVE (A) Negative          Pertinent ultrasound -          3/15/2017 3:21 PM    HISTORY/REASON FOR EXAM:  Size less than dates, evaluate fetal growth    TECHNIQUE/EXAM DESCRIPTION: OB limited ultrasound.    COMPARISON:  Obstetrical ultrasound 1/10/2017    FINDINGS:  Fetal Lie:  Vertex  LMP:  8/15/2016  Clinical RIGOBERTO by LMP:  2017    Placenta (Location):  Anterior  Placenta Previa: No    Amniotic Fluid Volume:  RAMON = 12.18 cm    Fetal Heart Rate:  133 bpm    Cervical Length:  3.95 cm    No maternal adnexal mass is identified.    Fetal Biometry  BPD    7.62 cm, 30 weeks, 4 days  HC    27.93 cm, 30 weeks, 4 days  AC    23.70 cm, 28 weeks  Femur Length    5.61 cm, 29 weeks, 4 days  Humerus Length    4.95 cm, 29 weeks, 1 day    EGA by this US:  29 weeks, 4 days  RIGOBERTO by this US: 2017  RIGOBERTO by 1st US:  2017    Estimated Fetal Weight:  1307 grams    Comments:         Impression        1.  Single intrauterine pregnancy of an estimated gestational age of 29 weeks, 4 days with an estimated date of delivery of 2017 which is within one week of the clinical dates.     Anatomy US WNL, possible placental lakes.    A) IUP at 37w1d       S=D         Patient Active Problem List    Diagnosis Date Noted   • Group beta Strep positive 2017     Priority: Medium   • Encounter for supervision of other normal pregnancy, third trimester  11/09/2016     Priority: Medium     Feeling lots of pelvic pressure, but declines exam.  By leopolds, vertex is engaged in pelvis.       P) s/s labor vs general discomforts. Fetal movements reviewed. General ed and anticipatory guidance. Nutrition/exercise/vitamin. Plans breast. Plans pp contraception- unsure. Continue PNV.

## 2017-05-02 NOTE — MR AVS SNAPSHOT
Rosalva Kruger   2017 1:45 PM   Routine Prenatal   MRN: [id number]    [facility]   Dept Phone:  [phone]    Description:  Female : 1990   Provider:  Karina Manley C.N.M.           Allergies as of 2017     No Known Allergies      Vital Signs     Blood Pressure Weight Last Menstrual Period Smoking Status          106/66 mmHg 57.153 kg (126 lb) 08/15/2016 Never Smoker         Basic Information     Date Of Birth Sex Race Ethnicity Preferred Language    1990 Female White Non- English      Problem List              ICD-10-CM Priority Class Noted - Resolved    Encounter for supervision of other normal pregnancy, third trimester Z34.83 Medium  2016 - Present    Group beta Strep positive B95.1   2017 - Present      Health Maintenance        Date Due Completion Dates    IMM HEP B VACCINE (1 of 3 - Primary Series) 1992 ---    IMM HEP A VACCINE (1 of 2 - Standard Series) 1993 ---    IMM HPV VACCINE (1 of 3 - Female 3 Dose Series) 2003 ---    IMM VARICELLA (CHICKENPOX) VACCINE (1 of 2 - 2 Dose Adolescent Series) 2005 ---    PAP SMEAR 2019    IMM DTaP/Tdap/Td Vaccine (3 - Td) 2027, 2013            Current Immunizations     Influenza Vaccine Quad Inj (Pf) 2013 11:30 AM    Tdap Vaccine 2017  2:22 PM, 2013 11:30 AM    Tuberculin Skin Test 3/21/2016      Below and/or attached are the medications your provider expects you to take. Review all of your home medications and newly ordered medications with your provider and/or pharmacist. Follow medication instructions as directed by your provider and/or pharmacist. Please keep your medication list with you and share with your provider. Update the information when medications are discontinued, doses are changed, or new medications (including over-the-counter products) are added; and carry medication information at all times in the event of  emergency situations     Allergies:  No Known Allergies          Medications  Valid as of: May 02, 2017 -  2:11 PM    Generic Name Brand Name Tablet Size Instructions for use    Prenatal Multivit-Min-Fe-FA   Take  by mouth.        .                 Medicines prescribed today were sent to:     [facility] [address] [place] [address] (), [place] - [address]    [address] () [place] [address]    Phone: [phone] Fax: [phone]    Open 24 Hours?: No      Medication refill instructions:       If your prescription bottle indicates you have medication refills left, it is not necessary to call your provider’s office. Please contact your pharmacy and they will refill your medication.    If your prescription bottle indicates you do not have any refills left, you may request refills at any time through one of the following ways: The online Integrate system (except Urgent Care), by calling your provider’s office, or by asking your pharmacy to contact your provider’s office with a refill request. Medication refills are processed only during regular business hours and may not be available until the next business day. Your provider may request additional information or to have a follow-up visit with you prior to refilling your medication.   *Please Note: Medication refills are assigned a new Rx number when refilled electronically. Your pharmacy may indicate that no refills were authorized even though a new prescription for the same medication is available at the pharmacy. Please request the medicine by name with the pharmacy before contacting your provider for a refill.        Other Notes About Your Plan     Baby Girl           MyChart Status: Patient Declined

## 2017-05-10 ENCOUNTER — ROUTINE PRENATAL (OUTPATIENT)
Dept: OBGYN | Facility: CLINIC | Age: 27
End: 2017-05-10
Payer: MEDICAID

## 2017-05-10 VITALS — DIASTOLIC BLOOD PRESSURE: 60 MMHG | SYSTOLIC BLOOD PRESSURE: 100 MMHG | WEIGHT: 127 LBS | BODY MASS INDEX: 23.22 KG/M2

## 2017-05-10 DIAGNOSIS — Z34.83 ENCOUNTER FOR SUPERVISION OF OTHER NORMAL PREGNANCY, THIRD TRIMESTER: ICD-10-CM

## 2017-05-10 PROCEDURE — 90040 PR PRENATAL FOLLOW UP: CPT | Performed by: NURSE PRACTITIONER

## 2017-05-10 NOTE — PROGRESS NOTES
S) Pt is a 26 y.o.   at 38w2d  gestation. Routine prenatal care today. No complaints.  Reports good fetal movement. Denies cramping, bleeding or leaking of fluid. Denies dysuria. Generally feels well today. Good self-care activities identified. Denies headaches, swelling, epigastric pain, or visual changes.     O) see flow         Filed Vitals:    05/10/17 1355   BP: 100/60   Weight: 57.607 kg (127 lb)           Lab:GBS +, GCT 98, AFP normal       Pertinent ultrasound -          1/10/2017 11:11 AM    HISTORY/REASON FOR EXAM:  Evaluate fetal anatomy    TECHNIQUE/EXAM DESCRIPTION: OB complete ultrasound.    COMPARISON:  None    FINDINGS:  Fetal Lie:  Breech  LMP:  8/15/2016  Clinical RIGOBERTO by LMP:  2017    Placenta (Location):  Anterior  Placenta Previa: No  Placental Grade: I    Amniotic Fluid Volume:  RAMON = 11.8 cm    Fetal Heart Rate:  136 bpm    Cervical Length:  3.65 cm transabdominal    No maternal adnexal mass is identified.    Umbilical Artery S/D Ratio(s):  Not applicable    Fetal Anatomy  (Seen or Not Seen)  Lateral Ventricles     Seen  Cisterna Magna        Seen  Cerebellum              Seen  CSP             Seen  Orbits             Seen  Face/Lips                Seen  Cord Insertion         Seen  Placental CI         Seen  4 Chamber Heart     Seen  LVOT               Seen  RVOT              Seen  Stomach       Seen  Kidneys                   Seen  Urinary Bladder      Seen  Spine                       Seen  3 Vessel Cord          Seen  Both Upper Extremities    Seen  Both Lower Extremities    Seen  Diaphragm             Seen  Movement       Seen  Gender:  Likely female    Fetal Biometry  BPD    4.61 cm, 20w 0d  HC    17.51 cm, 20w 0d  AC    15.04 cm, 20w 2d  Femur Length    3.44 cm, 20w 6d  Humerus Length  Cerebellum Diameter   2.21 cm    EGA by this US:  20w 2d  RIGOBERTO by this US: 2017  RIGOBERTO by 1st US:  Not applicable    Estimated Fetal Weight:  355 g    Comments:  Hypoechoic area present  within the placenta consistent with placental lake, measuring 1.7 cm.         Impression        Single intrauterine pregnancy of an estimated gestational age of 20w 2d with an estimated date of delivery of 5/28/2017.    Fetal survey within normal limits.    Placental lake noted, of doubtful clinical significance.         A) IUP at 38w2d       S<D         Patient Active Problem List    Diagnosis Date Noted   • Group beta Strep positive 04/28/2017     Priority: Medium   • Encounter for supervision of other normal pregnancy, third trimester 11/09/2016     Priority: Medium           P) s/s labor vs general discomforts. Fetal movements reviewed. General ed and anticipatory guidance. Nutrition/exercise/vitamin. Plans breast. Plans pp contraception- unsure. Continue PNV.

## 2017-05-10 NOTE — MR AVS SNAPSHOT
Rosalva Kruger   5/10/2017 1:45 PM   Routine Prenatal   MRN: [id number]    Department[facility]  [facility]   Dept Phone:  [phone]    Description:  Female : 1990   Provider:  Karina Manley C.N.M.           Allergies as of 5/10/2017     No Known Allergies      You were diagnosed with     Encounter for supervision of other normal pregnancy, third trimester   [[id number]]         Vital Signs     Blood Pressure Weight Last Menstrual Period Smoking Status          100/60 mmHg 57.607 kg (127 lb) 08/15/2016 Never Smoker         Basic Information     Date Of Birth Sex Race Ethnicity Preferred Language    1990 Female White Non- English      Problem List              ICD-10-CM Priority Class Noted - Resolved    Encounter for supervision of other normal pregnancy, third trimester Z34.83 Medium  2016 - Present    Group beta Strep positive B95.1 Medium  2017 - Present      Health Maintenance        Date Due Completion Dates    IMM HEP B VACCINE (1 of 3 - Primary Series) 1992 ---    IMM HEP A VACCINE (1 of 2 - Standard Series) 1993 ---    IMM HPV VACCINE (1 of 3 - Female 3 Dose Series) 2003 ---    IMM VARICELLA (CHICKENPOX) VACCINE (1 of 2 - 2 Dose Adolescent Series) 2005 ---    PAP SMEAR 2019    IMM DTaP/Tdap/Td Vaccine (3 - Td) 2027, 2013            Current Immunizations     Influenza Vaccine Quad Inj (Pf) 2013 11:30 AM    Tdap Vaccine 2017  2:22 PM, 2013 11:30 AM    Tuberculin Skin Test 3/21/2016      Below and/or attached are the medications your provider expects you to take. Review all of your home medications and newly ordered medications with your provider and/or pharmacist. Follow medication instructions as directed by your provider and/or pharmacist. Please keep your medication list with you and share with your provider. Update the information when medications are discontinued, doses are changed, or new  medications (including over-the-counter products) are added; and carry medication information at all times in the event of emergency situations     Allergies:  No Known Allergies          Medications  Valid as of: May 10, 2017 -  2:11 PM    Generic Name Brand Name Tablet Size Instructions for use    Prenatal Multivit-Min-Fe-FA   Take  by mouth.        .                 Medicines prescribed today were sent to:     [facility] [address] [place] [address] (), [place] - [address]    [address] () [place] [address]    Phone: [phone] Fax: [phone]    Open 24 Hours?: No      Medication refill instructions:       If your prescription bottle indicates you have medication refills left, it is not necessary to call your provider’s office. Please contact your pharmacy and they will refill your medication.    If your prescription bottle indicates you do not have any refills left, you may request refills at any time through one of the following ways: The online Vacation View system (except Urgent Care), by calling your provider’s office, or by asking your pharmacy to contact your provider’s office with a refill request. Medication refills are processed only during regular business hours and may not be available until the next business day. Your provider may request additional information or to have a follow-up visit with you prior to refilling your medication.   *Please Note: Medication refills are assigned a new Rx number when refilled electronically. Your pharmacy may indicate that no refills were authorized even though a new prescription for the same medication is available at the pharmacy. Please request the medicine by name with the pharmacy before contacting your provider for a refill.        Other Notes About Your Plan     PNL WNL, AFP- WNL, Level 2 us c/w dates, growth us c/s dates. GBS +. Baby Girl           Torbithart Access Code: Activation code not generated  Current Vacation View Status: Active

## 2017-05-10 NOTE — PROGRESS NOTES
Pt. Here for OB/FU today. Reports Good FM.   Good # [phone]  Pt states no complaints.   GBS positive

## 2017-05-19 ENCOUNTER — ROUTINE PRENATAL (OUTPATIENT)
Dept: OBGYN | Facility: CLINIC | Age: 27
End: 2017-05-19
Payer: MEDICAID

## 2017-05-19 VITALS — BODY MASS INDEX: 23.59 KG/M2 | WEIGHT: 129 LBS | SYSTOLIC BLOOD PRESSURE: 110 MMHG | DIASTOLIC BLOOD PRESSURE: 66 MMHG

## 2017-05-19 DIAGNOSIS — Z34.83 ENCOUNTER FOR SUPERVISION OF OTHER NORMAL PREGNANCY, THIRD TRIMESTER: ICD-10-CM

## 2017-05-19 PROCEDURE — 90040 PR PRENATAL FOLLOW UP: CPT | Performed by: NURSE PRACTITIONER

## 2017-05-19 NOTE — MR AVS SNAPSHOT
Rosalva Avila Slick   2017 4:15 PM   Routine Prenatal   MRN: [id number]    Department[facility]  [facility]   Dept Phone:  [phone]    Description:  Female : 1990   Provider:  Karina Manley C.N.M.           Allergies as of 2017     No Known Allergies      You were diagnosed with     Encounter for supervision of other normal pregnancy, third trimester   [[id number]]         Vital Signs     Blood Pressure Weight Last Menstrual Period Smoking Status          110/66 mmHg 58.514 kg (129 lb) 08/15/2016 Never Smoker         Basic Information     Date Of Birth Sex Race Ethnicity Preferred Language    1990 Female White Non- English      Your appointments     May 25, 2017  4:15 PM   OB Follow Up with Corinne Ware C.N.M.   [facility] [address]  [facility] [id number] [phone]              Problem List              ICD-10-CM Priority Class Noted - Resolved    Encounter for supervision of other normal pregnancy, third trimester Z34.83 Medium  2016 - Present    Group beta Strep positive B95.1 Medium  2017 - Present      Health Maintenance        Date Due Completion Dates    IMM HEP B VACCINE (1 of 3 - Primary Series) 1992 ---    IMM HEP A VACCINE (1 of 2 - Standard Series) 1993 ---    IMM HPV VACCINE (1 of 3 - Female 3 Dose Series) 2003 ---    IMM VARICELLA (CHICKENPOX) VACCINE (1 of 2 - 2 Dose Adolescent Series) 2005 ---    PAP SMEAR 2019    IMM DTaP/Tdap/Td Vaccine (3 - Td) 2027, 2013            Current Immunizations     Influenza Vaccine Quad Inj (Pf) 2013 11:30 AM    Tdap Vaccine 2017  2:22 PM, 2013 11:30 AM    Tuberculin Skin Test 3/21/2016      Below and/or attached are the medications your provider expects you to take. Review all of your home medications and newly ordered medications with your provider and/or pharmacist. Follow medication instructions as  directed by your provider and/or pharmacist. Please keep your medication list with you and share with your provider. Update the information when medications are discontinued, doses are changed, or new medications (including over-the-counter products) are added; and carry medication information at all times in the event of emergency situations     Allergies:  No Known Allergies          Medications  Valid as of: May 19, 2017 -  4:37 PM    Generic Name Brand Name Tablet Size Instructions for use    Prenatal Multivit-Min-Fe-FA   Take  by mouth.        .                 Medicines prescribed today were sent to:     [address] (), [place] - [address]    [address] () [place] [address]    Phone: [phone] Fax: [phone]    Open 24 Hours?: No      Medication refill instructions:       If your prescription bottle indicates you have medication refills left, it is not necessary to call your provider’s office. Please contact your pharmacy and they will refill your medication.    If your prescription bottle indicates you do not have any refills left, you may request refills at any time through one of the following ways: The online Pricelock system (except Urgent Care), by calling your provider’s office, or by asking your pharmacy to contact your provider’s office with a refill request. Medication refills are processed only during regular business hours and may not be available until the next business day. Your provider may request additional information or to have a follow-up visit with you prior to refilling your medication.   *Please Note: Medication refills are assigned a new Rx number when refilled electronically. Your pharmacy may indicate that no refills were authorized even though a new prescription for the same medication is available at the pharmacy. Please request the medicine by name with the pharmacy before contacting your provider for a refill.        Your To Do List     Future  Labs/Procedures Complete By Expires    INDUCTION OF LABOR  As directed 5/19/2018      Other Notes About Your Plan     PNL WNL, AFP- WNL, Level 2 us c/w dates, growth us c/s dates. GBS +. Baby Girl           MyChart Access Code: Activation code not generated  Current MyChart Status: Active

## 2017-05-19 NOTE — PROGRESS NOTES
S) Pt is a 26 y.o.   at 39w4d  gestation. Routine prenatal care today. No complaints today.  Reports good fetal movement. Denies cramping, bleeding or leaking of fluid. Denies dysuria. Generally feels well today. Good self-care activities identified. Denies headaches, visual changes, epigastric pain, or swelling.     O) see flow         Filed Vitals:    17 1619   BP: 110/66   Weight: 58.514 kg (129 lb)           Lab:PNL WNL, AFP WNL, GCT 98, GBS +       Pertinent ultrasound -          1/10/2017 11:11 AM    HISTORY/REASON FOR EXAM:  Evaluate fetal anatomy    TECHNIQUE/EXAM DESCRIPTION: OB complete ultrasound.    COMPARISON:  None    FINDINGS:  Fetal Lie:  Breech  LMP:  8/15/2016  Clinical RIGOBERTO by LMP:  2017    Placenta (Location):  Anterior  Placenta Previa: No  Placental Grade: I    Amniotic Fluid Volume:  RAMON = 11.8 cm    Fetal Heart Rate:  136 bpm    Cervical Length:  3.65 cm transabdominal    No maternal adnexal mass is identified.    Umbilical Artery S/D Ratio(s):  Not applicable    Fetal Anatomy  (Seen or Not Seen)  Lateral Ventricles     Seen  Cisterna Magna        Seen  Cerebellum              Seen  CSP             Seen  Orbits             Seen  Face/Lips                Seen  Cord Insertion         Seen  Placental CI         Seen  4 Chamber Heart     Seen  LVOT               Seen  RVOT              Seen  Stomach       Seen  Kidneys                   Seen  Urinary Bladder      Seen  Spine                       Seen  3 Vessel Cord          Seen  Both Upper Extremities    Seen  Both Lower Extremities    Seen  Diaphragm             Seen  Movement       Seen  Gender:  Likely female    Fetal Biometry  BPD    4.61 cm, 20w 0d  HC    17.51 cm, 20w 0d  AC    15.04 cm, 20w 2d  Femur Length    3.44 cm, 20w 6d  Humerus Length  Cerebellum Diameter   2.21 cm    EGA by this US:  20w 2d  RIGOBERTO by this US: 2017  RIGOBERTO by 1st US:  Not applicable    Estimated Fetal Weight:  355 g    Comments:  Hypoechoic area  present within the placenta consistent with placental lake, measuring 1.7 cm.         Impression        Single intrauterine pregnancy of an estimated gestational age of 20w 2d with an estimated date of delivery of 5/28/2017.    Fetal survey within normal limits.    Placental lake noted, of doubtful clinical significance.           A) IUP at 39w4d       S=D         Patient Active Problem List    Diagnosis Date Noted   • Group beta Strep positive 04/28/2017     Priority: Medium   • Encounter for supervision of other normal pregnancy, third trimester 11/09/2016     Priority: Medium           P) s/s ptl vs general discomforts. Fetal movements reviewed. General ed and anticipatory guidance. Nutrition/exercise/vitamin. Plans breast. Plans pp contraception- Unsure. Continue PNV.

## 2017-05-19 NOTE — PROGRESS NOTES
Pt here today for OB follow up  Positive GBS, pt aware  Reports +FM  WT:  129 lb  BP: 110/66  Pt states having belly tightness and feeling nauseous. States having a headache and lower back pain.   Good # [phone]

## 2017-05-23 ENCOUNTER — TELEPHONE (OUTPATIENT)
Dept: OBGYN | Facility: CLINIC | Age: 27
End: 2017-05-23

## 2017-05-23 NOTE — TELEPHONE ENCOUNTER
Late entry. Patient called asking about IOL. She was notified that her gel is scheduled for 5/25/17 @ 8:00 PM and IOL 5/26/17@ 8;00 AM. Patient also scheduled for ob f/u on 5/25/17@ 4:15pm. Consulted with Corinne Ware CNM. (patient is scheduled with her) to check if appt is still needed. She advised no need. Patient just to go to L&D that day at 8:00 pm.  5/23/17 @ 9:54am. N/a, msg left for patient to call back.

## 2017-05-25 ENCOUNTER — HOSPITAL ENCOUNTER (OUTPATIENT)
Facility: MEDICAL CENTER | Age: 27
End: 2017-05-25
Attending: OBSTETRICS & GYNECOLOGY | Admitting: OBSTETRICS & GYNECOLOGY
Payer: MEDICAID

## 2017-05-25 VITALS — WEIGHT: 130 LBS | BODY MASS INDEX: 23.92 KG/M2 | HEIGHT: 62 IN

## 2017-05-25 PROCEDURE — 59200 INSERT CERVICAL DILATOR: CPT

## 2017-05-25 PROCEDURE — 59025 FETAL NON-STRESS TEST: CPT | Performed by: NURSE PRACTITIONER

## 2017-05-25 PROCEDURE — 700101 HCHG RX REV CODE 250: Performed by: NURSE PRACTITIONER

## 2017-05-25 RX ADMIN — DINOPROSTONE 5 MG: 20 SUPPOSITORY VAGINAL at 20:50

## 2017-05-26 ENCOUNTER — HOSPITAL ENCOUNTER (INPATIENT)
Facility: MEDICAL CENTER | Age: 27
LOS: 2 days | End: 2017-05-28
Attending: OBSTETRICS & GYNECOLOGY | Admitting: OBSTETRICS & GYNECOLOGY
Payer: MEDICAID

## 2017-05-26 LAB
AMPHET UR QL SCN: NEGATIVE
BARBITURATES UR QL SCN: NEGATIVE
BASOPHILS # BLD AUTO: 0.3 % (ref 0–1.8)
BASOPHILS # BLD: 0.02 K/UL (ref 0–0.12)
BENZODIAZ UR QL SCN: NEGATIVE
BZE UR QL SCN: NEGATIVE
CANNABINOIDS UR QL SCN: NEGATIVE
EOSINOPHIL # BLD AUTO: 0.03 K/UL (ref 0–0.51)
EOSINOPHIL NFR BLD: 0.4 % (ref 0–6.9)
ERYTHROCYTE [DISTWIDTH] IN BLOOD BY AUTOMATED COUNT: 41.8 FL (ref 35.9–50)
ERYTHROCYTE [DISTWIDTH] IN BLOOD BY AUTOMATED COUNT: 42.5 FL (ref 35.9–50)
HCT VFR BLD AUTO: 32.7 % (ref 37–47)
HCT VFR BLD AUTO: 36.2 % (ref 37–47)
HGB BLD-MCNC: 10.7 G/DL (ref 12–16)
HGB BLD-MCNC: 12.1 G/DL (ref 12–16)
HOLDING TUBE BB 8507: NORMAL
IMM GRANULOCYTES # BLD AUTO: 0.02 K/UL (ref 0–0.11)
IMM GRANULOCYTES NFR BLD AUTO: 0.3 % (ref 0–0.9)
LYMPHOCYTES # BLD AUTO: 2.35 K/UL (ref 1–4.8)
LYMPHOCYTES NFR BLD: 31.5 % (ref 22–41)
MCH RBC QN AUTO: 29.6 PG (ref 27–33)
MCH RBC QN AUTO: 29.8 PG (ref 27–33)
MCHC RBC AUTO-ENTMCNC: 32.7 G/DL (ref 33.6–35)
MCHC RBC AUTO-ENTMCNC: 33.4 G/DL (ref 33.6–35)
MCV RBC AUTO: 89.2 FL (ref 81.4–97.8)
MCV RBC AUTO: 90.3 FL (ref 81.4–97.8)
MDMA UR QL SCN: NEGATIVE
METHADONE UR QL SCN: NEGATIVE
MONOCYTES # BLD AUTO: 0.46 K/UL (ref 0–0.85)
MONOCYTES NFR BLD AUTO: 6.2 % (ref 0–13.4)
NEUTROPHILS # BLD AUTO: 4.58 K/UL (ref 2–7.15)
NEUTROPHILS NFR BLD: 61.3 % (ref 44–72)
NRBC # BLD AUTO: 0 K/UL
NRBC BLD AUTO-RTO: 0 /100 WBC
OPIATES UR QL SCN: NEGATIVE
OXYCODONE UR QL SCN: NEGATIVE
PCP UR QL SCN: NEGATIVE
PLATELET # BLD AUTO: 203 K/UL (ref 164–446)
PLATELET # BLD AUTO: 242 K/UL (ref 164–446)
PMV BLD AUTO: 9.1 FL (ref 9–12.9)
PMV BLD AUTO: 9.2 FL (ref 9–12.9)
PROPOXYPH UR QL SCN: NEGATIVE
RBC # BLD AUTO: 3.62 M/UL (ref 4.2–5.4)
RBC # BLD AUTO: 4.06 M/UL (ref 4.2–5.4)
WBC # BLD AUTO: 11.8 K/UL (ref 4.8–10.8)
WBC # BLD AUTO: 7.5 K/UL (ref 4.8–10.8)

## 2017-05-26 PROCEDURE — 304965 HCHG RECOVERY SERVICES

## 2017-05-26 PROCEDURE — 36415 COLL VENOUS BLD VENIPUNCTURE: CPT

## 2017-05-26 PROCEDURE — 303615 HCHG EPIDURAL/SPINAL ANESTHESIA FOR LABOR

## 2017-05-26 PROCEDURE — 700111 HCHG RX REV CODE 636 W/ 250 OVERRIDE (IP): Performed by: NURSE PRACTITIONER

## 2017-05-26 PROCEDURE — 700102 HCHG RX REV CODE 250 W/ 637 OVERRIDE(OP): Performed by: NURSE PRACTITIONER

## 2017-05-26 PROCEDURE — 770002 HCHG ROOM/CARE - OB PRIVATE (112)

## 2017-05-26 PROCEDURE — 700105 HCHG RX REV CODE 258: Performed by: NURSE PRACTITIONER

## 2017-05-26 PROCEDURE — 80307 DRUG TEST PRSMV CHEM ANLYZR: CPT

## 2017-05-26 PROCEDURE — 700111 HCHG RX REV CODE 636 W/ 250 OVERRIDE (IP)

## 2017-05-26 PROCEDURE — 59409 OBSTETRICAL CARE: CPT

## 2017-05-26 PROCEDURE — 85025 COMPLETE CBC W/AUTO DIFF WBC: CPT

## 2017-05-26 PROCEDURE — A9270 NON-COVERED ITEM OR SERVICE: HCPCS | Performed by: NURSE PRACTITIONER

## 2017-05-26 PROCEDURE — 85027 COMPLETE CBC AUTOMATED: CPT

## 2017-05-26 RX ORDER — SODIUM CHLORIDE, SODIUM LACTATE, POTASSIUM CHLORIDE, CALCIUM CHLORIDE 600; 310; 30; 20 MG/100ML; MG/100ML; MG/100ML; MG/100ML
INJECTION, SOLUTION INTRAVENOUS
Status: ACTIVE
Start: 2017-05-26 | End: 2017-05-26

## 2017-05-26 RX ORDER — METOCLOPRAMIDE HYDROCHLORIDE 5 MG/ML
10 INJECTION INTRAMUSCULAR; INTRAVENOUS EVERY 6 HOURS PRN
Status: DISCONTINUED | OUTPATIENT
Start: 2017-05-26 | End: 2017-05-28 | Stop reason: HOSPADM

## 2017-05-26 RX ORDER — ONDANSETRON 2 MG/ML
INJECTION INTRAMUSCULAR; INTRAVENOUS
Status: DISCONTINUED
Start: 2017-05-26 | End: 2017-05-26

## 2017-05-26 RX ORDER — ONDANSETRON 2 MG/ML
4 INJECTION INTRAMUSCULAR; INTRAVENOUS EVERY 6 HOURS PRN
OUTPATIENT
Start: 2017-05-26

## 2017-05-26 RX ORDER — DOCUSATE SODIUM 100 MG/1
100 CAPSULE, LIQUID FILLED ORAL 2 TIMES DAILY PRN
Status: DISCONTINUED | OUTPATIENT
Start: 2017-05-26 | End: 2017-05-28 | Stop reason: HOSPADM

## 2017-05-26 RX ORDER — HYDROCODONE BITARTRATE AND ACETAMINOPHEN 5; 325 MG/1; MG/1
1 TABLET ORAL EVERY 4 HOURS PRN
Status: DISCONTINUED | OUTPATIENT
Start: 2017-05-26 | End: 2017-05-28 | Stop reason: HOSPADM

## 2017-05-26 RX ORDER — OXYTOCIN 10 [USP'U]/ML
10 INJECTION, SOLUTION INTRAMUSCULAR; INTRAVENOUS
Status: DISCONTINUED | OUTPATIENT
Start: 2017-05-26 | End: 2017-05-26 | Stop reason: HOSPADM

## 2017-05-26 RX ORDER — SODIUM CHLORIDE, SODIUM LACTATE, POTASSIUM CHLORIDE, CALCIUM CHLORIDE 600; 310; 30; 20 MG/100ML; MG/100ML; MG/100ML; MG/100ML
INJECTION, SOLUTION INTRAVENOUS PRN
Status: DISCONTINUED | OUTPATIENT
Start: 2017-05-26 | End: 2017-05-28 | Stop reason: HOSPADM

## 2017-05-26 RX ORDER — CARBOPROST TROMETHAMINE 250 UG/ML
250 INJECTION, SOLUTION INTRAMUSCULAR
Status: DISCONTINUED | OUTPATIENT
Start: 2017-05-26 | End: 2017-05-26 | Stop reason: HOSPADM

## 2017-05-26 RX ORDER — SODIUM CHLORIDE, SODIUM LACTATE, POTASSIUM CHLORIDE, CALCIUM CHLORIDE 600; 310; 30; 20 MG/100ML; MG/100ML; MG/100ML; MG/100ML
INJECTION, SOLUTION INTRAVENOUS CONTINUOUS
Status: DISCONTINUED | OUTPATIENT
Start: 2017-05-26 | End: 2017-05-26 | Stop reason: HOSPADM

## 2017-05-26 RX ORDER — MISOPROSTOL 200 UG/1
600 TABLET ORAL
Status: DISCONTINUED | OUTPATIENT
Start: 2017-05-26 | End: 2017-05-28 | Stop reason: HOSPADM

## 2017-05-26 RX ORDER — ROPIVACAINE HYDROCHLORIDE 2 MG/ML
INJECTION, SOLUTION EPIDURAL; INFILTRATION; PERINEURAL
Status: COMPLETED
Start: 2017-05-26 | End: 2017-05-26

## 2017-05-26 RX ORDER — HYDROCODONE BITARTRATE AND ACETAMINOPHEN 10; 325 MG/1; MG/1
1 TABLET ORAL EVERY 4 HOURS PRN
Status: DISCONTINUED | OUTPATIENT
Start: 2017-05-26 | End: 2017-05-28 | Stop reason: HOSPADM

## 2017-05-26 RX ORDER — TERBUTALINE SULFATE 1 MG/ML
0.25 INJECTION, SOLUTION SUBCUTANEOUS ONCE
Status: COMPLETED | OUTPATIENT
Start: 2017-05-26 | End: 2017-05-26

## 2017-05-26 RX ORDER — METHYLERGONOVINE MALEATE 0.2 MG/ML
0.2 INJECTION INTRAVENOUS
Status: DISCONTINUED | OUTPATIENT
Start: 2017-05-26 | End: 2017-05-28 | Stop reason: HOSPADM

## 2017-05-26 RX ORDER — VITAMIN A ACETATE, BETA CAROTENE, ASCORBIC ACID, CHOLECALCIFEROL, .ALPHA.-TOCOPHEROL ACETATE, DL-, THIAMINE MONONITRATE, RIBOFLAVIN, NIACINAMIDE, PYRIDOXINE HYDROCHLORIDE, FOLIC ACID, CYANOCOBALAMIN, CALCIUM CARBONATE, FERROUS FUMARATE, ZINC OXIDE, CUPRIC OXIDE 3080; 12; 120; 400; 1; 1.84; 3; 20; 22; 920; 25; 200; 27; 10; 2 [IU]/1; UG/1; MG/1; [IU]/1; MG/1; MG/1; MG/1; MG/1; MG/1; [IU]/1; MG/1; MG/1; MG/1; MG/1; MG/1
1 TABLET, FILM COATED ORAL EVERY MORNING
Status: DISCONTINUED | OUTPATIENT
Start: 2017-05-26 | End: 2017-05-28 | Stop reason: HOSPADM

## 2017-05-26 RX ORDER — IBUPROFEN 800 MG/1
800 TABLET ORAL EVERY 8 HOURS PRN
Status: DISCONTINUED | OUTPATIENT
Start: 2017-05-26 | End: 2017-05-28 | Stop reason: HOSPADM

## 2017-05-26 RX ORDER — MISOPROSTOL 200 UG/1
800 TABLET ORAL
Status: DISCONTINUED | OUTPATIENT
Start: 2017-05-26 | End: 2017-05-26 | Stop reason: HOSPADM

## 2017-05-26 RX ORDER — METHYLERGONOVINE MALEATE 0.2 MG/ML
0.2 INJECTION INTRAVENOUS
Status: DISCONTINUED | OUTPATIENT
Start: 2017-05-26 | End: 2017-05-26 | Stop reason: HOSPADM

## 2017-05-26 RX ORDER — PENICILLIN G POTASSIUM 5000000 [IU]/1
5 INJECTION, POWDER, FOR SOLUTION INTRAMUSCULAR; INTRAVENOUS ONCE
Status: DISCONTINUED | OUTPATIENT
Start: 2017-05-26 | End: 2017-05-26

## 2017-05-26 RX ORDER — CARBOPROST TROMETHAMINE 250 UG/ML
250 INJECTION, SOLUTION INTRAMUSCULAR
Status: DISCONTINUED | OUTPATIENT
Start: 2017-05-26 | End: 2017-05-28 | Stop reason: HOSPADM

## 2017-05-26 RX ORDER — TERBUTALINE SULFATE 1 MG/ML
0.25 INJECTION, SOLUTION SUBCUTANEOUS PRN
Status: DISCONTINUED | OUTPATIENT
Start: 2017-05-26 | End: 2017-05-26 | Stop reason: HOSPADM

## 2017-05-26 RX ORDER — DEXTROSE, SODIUM CHLORIDE, SODIUM LACTATE, POTASSIUM CHLORIDE, AND CALCIUM CHLORIDE 5; .6; .31; .03; .02 G/100ML; G/100ML; G/100ML; G/100ML; G/100ML
INJECTION, SOLUTION INTRAVENOUS CONTINUOUS
Status: DISCONTINUED | OUTPATIENT
Start: 2017-05-26 | End: 2017-05-26 | Stop reason: HOSPADM

## 2017-05-26 RX ORDER — ACETAMINOPHEN 325 MG/1
325 TABLET ORAL EVERY 4 HOURS PRN
Status: DISCONTINUED | OUTPATIENT
Start: 2017-05-26 | End: 2017-05-28 | Stop reason: HOSPADM

## 2017-05-26 RX ORDER — ONDANSETRON 4 MG/1
4 TABLET, ORALLY DISINTEGRATING ORAL EVERY 6 HOURS PRN
OUTPATIENT
Start: 2017-05-26

## 2017-05-26 RX ORDER — BISACODYL 10 MG
10 SUPPOSITORY, RECTAL RECTAL PRN
Status: DISCONTINUED | OUTPATIENT
Start: 2017-05-26 | End: 2017-05-28 | Stop reason: HOSPADM

## 2017-05-26 RX ORDER — PENICILLIN G POTASSIUM 5000000 [IU]/1
INJECTION, POWDER, FOR SOLUTION INTRAMUSCULAR; INTRAVENOUS
Status: COMPLETED
Start: 2017-05-26 | End: 2017-05-26

## 2017-05-26 RX ORDER — PENICILLIN G POTASSIUM 5000000 [IU]/1
5 INJECTION, POWDER, FOR SOLUTION INTRAMUSCULAR; INTRAVENOUS ONCE
Status: COMPLETED | OUTPATIENT
Start: 2017-05-26 | End: 2017-05-26

## 2017-05-26 RX ORDER — ONDANSETRON 4 MG/1
4 TABLET, ORALLY DISINTEGRATING ORAL EVERY 6 HOURS PRN
Status: DISCONTINUED | OUTPATIENT
Start: 2017-05-26 | End: 2017-05-28 | Stop reason: HOSPADM

## 2017-05-26 RX ORDER — ALUMINA, MAGNESIA, AND SIMETHICONE 2400; 2400; 240 MG/30ML; MG/30ML; MG/30ML
30 SUSPENSION ORAL EVERY 6 HOURS PRN
Status: DISCONTINUED | OUTPATIENT
Start: 2017-05-26 | End: 2017-05-26 | Stop reason: HOSPADM

## 2017-05-26 RX ORDER — ONDANSETRON 2 MG/ML
4 INJECTION INTRAMUSCULAR; INTRAVENOUS EVERY 6 HOURS PRN
Status: DISCONTINUED | OUTPATIENT
Start: 2017-05-26 | End: 2017-05-28 | Stop reason: HOSPADM

## 2017-05-26 RX ADMIN — IBUPROFEN 800 MG: 800 TABLET, FILM COATED ORAL at 08:25

## 2017-05-26 RX ADMIN — SODIUM CHLORIDE 2.5 MILLION UNITS: 9 INJECTION, SOLUTION INTRAVENOUS at 04:55

## 2017-05-26 RX ADMIN — SODIUM CHLORIDE, POTASSIUM CHLORIDE, SODIUM LACTATE AND CALCIUM CHLORIDE: 600; 310; 30; 20 INJECTION, SOLUTION INTRAVENOUS at 01:00

## 2017-05-26 RX ADMIN — PENICILLIN G POTASSIUM 5 MILLION UNITS: 5000000 POWDER, FOR SOLUTION INTRAMUSCULAR; INTRAPLEURAL; INTRATHECAL; INTRAVENOUS at 01:03

## 2017-05-26 RX ADMIN — SODIUM CHLORIDE, POTASSIUM CHLORIDE, SODIUM LACTATE AND CALCIUM CHLORIDE: 600; 310; 30; 20 INJECTION, SOLUTION INTRAVENOUS at 01:54

## 2017-05-26 RX ADMIN — HYDROCODONE BITARTRATE AND ACETAMINOPHEN 1 TABLET: 10; 325 TABLET ORAL at 08:25

## 2017-05-26 RX ADMIN — IBUPROFEN 800 MG: 800 TABLET, FILM COATED ORAL at 15:34

## 2017-05-26 RX ADMIN — IBUPROFEN 800 MG: 800 TABLET, FILM COATED ORAL at 23:40

## 2017-05-26 RX ADMIN — TERBUTALINE SULFATE 0.25 MG: 1 INJECTION, SOLUTION SUBCUTANEOUS at 03:33

## 2017-05-26 RX ADMIN — SODIUM CHLORIDE, POTASSIUM CHLORIDE, SODIUM LACTATE AND CALCIUM CHLORIDE: 600; 310; 30; 20 INJECTION, SOLUTION INTRAVENOUS at 04:54

## 2017-05-26 RX ADMIN — Medication 125 ML/HR: at 08:03

## 2017-05-26 RX ADMIN — HYDROCODONE BITARTRATE AND ACETAMINOPHEN 1 TABLET: 10; 325 TABLET ORAL at 15:34

## 2017-05-26 RX ADMIN — FENTANYL CITRATE 100 MCG: 50 INJECTION, SOLUTION INTRAMUSCULAR; INTRAVENOUS at 01:02

## 2017-05-26 RX ADMIN — ROPIVACAINE HYDROCHLORIDE 200 MG: 2 INJECTION, SOLUTION EPIDURAL; INFILTRATION at 01:54

## 2017-05-26 RX ADMIN — PENICILLIN G POTASSIUM 5 MILLION UNITS: 5000000 INJECTION, POWDER, FOR SOLUTION INTRAMUSCULAR; INTRAVENOUS at 01:03

## 2017-05-26 RX ADMIN — HYDROCODONE BITARTRATE AND ACETAMINOPHEN 1 TABLET: 10; 325 TABLET ORAL at 12:18

## 2017-05-26 RX ADMIN — HYDROCODONE BITARTRATE AND ACETAMINOPHEN 1 TABLET: 10; 325 TABLET ORAL at 21:09

## 2017-05-26 ASSESSMENT — LIFESTYLE VARIABLES
DO YOU DRINK ALCOHOL: NO
ALCOHOL_USE: NO
EVER_SMOKED: NEVER

## 2017-05-26 ASSESSMENT — PAIN SCALES - GENERAL
PAINLEVEL_OUTOF10: 5
PAINLEVEL_OUTOF10: 10
PAINLEVEL_OUTOF10: 0
PAINLEVEL_OUTOF10: 0
PAINLEVEL_OUTOF10: 7
PAINLEVEL_OUTOF10: 7
PAINLEVEL_OUTOF10: 2
PAINLEVEL_OUTOF10: 7

## 2017-05-26 NOTE — PROGRESS NOTES
26 y.o., , EDC 17 (40.4 wks)      Pt presents c/o uc's that started when she got home around 2200. Rates her uc's 9/10. Denies BAL MAE. States pos FM. SVE=/-3. A Presybeterian updated. Order received for pt to walk unit for an hour and call CNM after she has walked. Pt does not want to walk - would like an epidural. CNM okay with that - order to admit patient.     0200-pt comfortable with epidural. Questions answered, POC discussed.   5-A Presybeterian updated on late decelerations & interventions. CNM reviewed tracing. Order to re-check cervix.   0636-delivery of viable female, apgars 8/9. A Presybeterian present for delivery.   0700-report to Talya TRACY.

## 2017-05-26 NOTE — IP AVS SNAPSHOT
Boombotix Access Code: Activation code not generated  Current Boombotix Status: Active    Sensumhart  A secure, online tool to manage your health information     [employer]’s Boombotix® is a secure, online tool that connects you to your personalized health information from the privacy of your home -- day or night - making it very easy for you to manage your healthcare. Once the activation process is completed, you can even access your medical information using the Boombotix simón, which is available for free in the Apple Simón store or Google Play store.     Boombotix provides the following levels of access (as shown below):   My Chart Features   [facility] Primary Care Doctor [facility]  Specialists [facility]  Urgent  Care Non-[facility]  Primary Care  Doctor   Email your healthcare team securely and privately 24/7 X X X X   Manage appointments: schedule your next appointment; view details of past/upcoming appointments X      Request prescription refills. X      View recent personal medical records, including lab and immunizations X X X X   View health record, including health history, allergies, medications X X X X   Read reports about your outpatient visits, procedures, consult and ER notes X X X X   See your discharge summary, which is a recap of your hospital and/or ER visit that includes your diagnosis, lab results, and care plan. X X       How to register for Boombotix:  1. Go to  [email or web address].  2. Click on the Sign Up Now box, which takes you to the New Member Sign Up page. You will need to provide the following information:  a. Enter your Boombotix Access Code exactly as it appears at the top of this page. (You will not need to use this code after you’ve completed the sign-up process. If you do not sign up before the expiration date, you must request a new code.)   b. Enter your date of birth.   c. Enter your home email address.   d. Click Submit, and follow the next screen’s instructions.  3. Create a Boombotix ID. This will  be your [employer] login ID and cannot be changed, so think of one that is secure and easy to remember.  4. Create a [employer] password. You can change your password at any time.  5. Enter your Password Reset Question and Answer. This can be used at a later time if you forget your password.   6. Enter your e-mail address. This allows you to receive e-mail notifications when new information is available in [employer] Uzhun.  7. Click Sign Up. You can now view your health information.    For assistance activating your [employer] account, call [phone]

## 2017-05-26 NOTE — IP AVS SNAPSHOT
5/28/2017    Rosalva Kruger  [address] [place] [address] [address]  [place] [address]    Dear Rosalva:    [facility] wants to ensure your discharge home is safe and you or your loved ones have had all of your questions answered regarding your care after you leave the hospital.    Below is a list of resources and contact information should you have any questions regarding your hospital stay, follow-up instructions, or active medical symptoms.    Questions or Concerns Regarding… Contact   Medical Questions Related to Your Discharge  (7 days a week, 8am-5pm) Contact a Nurse Care Coordinator   [phone]   Medical Questions Not Related to Your Discharge  (24 hours a day / 7 days a week)  Contact the Nurse Health Line   [phone]    Medications or Discharge Instructions Refer to your discharge packet   or contact your [facility] Primary Care Provider   [phone]   Follow-up Appointment(s) Schedule your appointment via PLTech   or contact Scheduling [phone]   Billing Review your statement via PLTech  or contact Billing [phone]   Medical Records Review your records via PLTech   or contact Medical Records [phone]     You may receive a telephone call within two days of discharge. This call is to make certain you understand your discharge instructions and have the opportunity to have any questions answered. You can also easily access your medical information, test results and upcoming appointments via the PLTech free online health management tool. You can learn more and sign up at [employer]/PLTech. For assistance setting up your PLTech account, please call [phone].    Once again, we want to ensure your discharge home is safe and that you have a clear understanding of any next steps in your care. If you have any questions or concerns, please do not hesitate to contact us, we are here for you. Thank you for choosing [facility] for your healthcare needs.    Sincerely,    Your [facility] Healthcare Team

## 2017-05-26 NOTE — PROGRESS NOTES
EDC 17 40.3 weeks pt is here for IOL with prostaglandin gel. Corinne Ware was notified. External monitors applied. SVE done closed thick high and very post.   prostin gel was placed pt to right side. Reactive strip. Pt is comfortable.  Corinne Ware was called report given and order received to discharge pt home. Pt will be here at 0800 AM. Instructions was given and pt was discharged home with FOB at 2208

## 2017-05-26 NOTE — IP AVS SNAPSHOT
Home Care Instructions                                                                                                                Rosalva Kruger   MRN: [id number]    Department:  POST PARTUM 31   2017           Follow-up Information     1. Follow up with Brannon Tate M.D. In 6 weeks.    Specialty:  Family Medicine    Contact information    [address]   [address]  [place] [address]  [phone]         I assume responsibility for securing a follow-up  Screening blood test on my baby within the specified date range.    -                  Discharge Instructions       POSTPARTUM DISCHARGE INSTRUCTIONS FOR MOM    YOB: 1990   Age: 26 y.o.               Admit Date: 2017     Discharge Date: 2017  Attending Doctor:  Olga Veliz M.D.                  Allergies:  Review of patient's allergies indicates no known allergies.    Discharged to home by car. Discharged via wheelchair, hospital escort: Yes.  Special equipment needed: Not Applicable  Belongings with: Personal  Be sure to schedule a follow-up appointment with your primary care doctor or any specialists as instructed.     Discharge Plan:   Diet Plan: Discussed  Activity Level: Discussed  Confirmed Follow up Appointment: Patient to Call and Schedule Appointment  Confirmed Symptoms Management: Discussed  Medication Reconciliation Updated: Yes  Influenza Vaccine Indication: Patient Refuses    REASONS TO CALL YOUR OBSTETRICIAN:  1.   Persistent fever or shaking chills (Temperature higher than 100.4)  2.   Heavy bleeding (soaking more than 1 pad per hour); Passing clots  3.   Foul odor from vagina  4.   Mastitis (Breast infection; breast pain, chills, fever, redness)  5.   Urinary pain, burning or frequency  6.   Episiotomy infection  7.   Abdominal incision infection  8.   Severe depression longer than 24 hours    HAND WASHING  · Prior to handling the baby.  · Before breastfeeding or bottle feeding baby.  · After using  "the bathroom or changing the baby's diaper.    WOUND CARE  Ask your physician for additional care instructions.  In general:    ·  Incision:      · Keep clean and dry.    · Do NOT lift anything heavier than your baby for up to 6 weeks.    · There should not be any opening or pus.      VAGINAL CARE  · Nothing inside vagina for 6 weeks: no sexual intercourse, tampons or douching.  · Bleeding may continue for 2-4 weeks.  Amount may vary.    · Call your physician for heavy bleeding which means soaking more than 1 pad per hour    BIRTH CONTROL  · It is possible to become pregnant at any time after delivery and while breastfeeding.  · Plan to discuss a method of birth control with your physician at your follow up visit. visit.    DIET AND ELIMINATION  · Eating more fiber (bran cereal, fruits, and vegetables) and drinking plenty of fluids will help to avoid constipation.  · Urinary frequency after childbirth is normal.    POSTPARTUM BLUES  During the first few days after birth, you may experience a sense of the \"blues\" which may include impatience, irritability or even crying.  These feeling come and go quickly.  However, as many as 1 in 10 women experience emotional symptoms known as postpartum depression.    Postpartum depression:  May start as early as the second or third day after delivery or take several weeks or months to develop.  Symptoms of \"blues\" are present, but are more intense:  Crying spells; loss of appetite; feelings of hopelessness or loss of control; fear of touching the baby; over concern or no concern at all about the baby; little or no concern about your own appearance/caring for yourself; and/or inability to sleep or excessive sleeping.  Contact your physician if you are experiencing any of these symptoms.    Crisis Hotline:  · National Crisis Hotline:  7-199-GFSWUVP  Or 1-739.724.3234  · [place] Crisis Hotline:  1-808.309.7567  Or 431-892-1074    PREVENTING SHAKEN BABY:  If you are angry or " "stressed, PUT THE BABY IN THE CRIB, step away, take some deep breaths, and wait until you are calm to care for the baby.  DO NOT SHAKE THE BABY.  You are not alone, call a supporter for help.    · Crisis Call Center 24/7 crisis line 916-383-2590 or 1-711.644.2111  · You can also text them, text \"ANSWER\" to 036883    QUIT SMOKING/TOBACCO USE:  I understand the use of any tobacco products increases my chance of suffering from future heart disease and could cause other illnesses which may shorten my life. Quitting the use of tobacco products is the single most important thing I can do to improve my health. For further information on smoking / tobacco cessation call a Toll Free Quit Line at 1-929.174.7262 (*National [employer] [place]) or 1-388.844.3822 (American Lung Association) or you can access the web based program at www.lungusa.org.    · [place] Tobacco Users Help Line:  (719) 328-5695       Toll Free: 1-345.561.3886  · Quit Tobacco Program [facility] (985)558-2963    DEPRESSION / SUICIDE RISK:  As you are discharged from this [facility], it is important to learn how to keep safe from harming yourself.    Recognize the warning signs:  · Abrupt changes in personality, positive or negative- including increase in energy   · Giving away possessions  · Change in eating patterns- significant weight changes-  positive or negative  · Change in sleeping patterns- unable to sleep or sleeping all the time   · Unwillingness or inability to communicate  · Depression  · Unusual sadness, discouragement and loneliness  · Talk of wanting to die  · Neglect of personal appearance   · Rebelliousness- reckless behavior  · Withdrawal from people/activities they love  · Confusion- inability to concentrate     If you or a loved one observes any of these behaviors or has concerns about self-harm, here's what you can do:  · Talk about it- your feelings and reasons for harming yourself  · Remove any means " that you might use to hurt yourself (examples: pills, rope, extension cords, firearm)  · Get professional help from the community (Mental Health, Substance Abuse, psychological counseling)  · Do not be alone:Call your Safe Contact- someone whom you trust who will be there for you.  · Call your local CRISIS HOTLINE 066-3635 or 180-947-3732  · Call your local Children's Mobile Crisis Response Team [place] (171) 310-5466 or [employer]  · Call the toll free National Suicide Prevention Hotlines   · National Suicide Prevention Lifeline 607-811-YDYS (6048)  · National Hope Line Network 800-SUICIDE (944-0722)    DISCHARGE SURVEY:  Thank you for choosing [facility].  We hope we provided you with very good care.  You may be receiving a survey in the mail.  Please fill it out.  Your opinion is valuable to us.    ADDITIONAL EDUCATIONAL MATERIALS GIVEN TO PATIENT:        My signature on this form indicates that:  1.  I have reviewed and understand the above information  2.  My questions regarding this information have been answered to my satisfaction.  3.  I have formulated a plan with my discharge nurse to obtain my prescribed medication for home.         Discharge Medication Instructions:    Below are the medications your physician expects you to take upon discharge:    Review all your home medications and newly ordered medications with your doctor and/or pharmacist. Follow medication instructions as directed by your doctor and/or pharmacist.    Please keep your medication list with you and share with your physician.               Medication List      START taking these medications        Instructions    Morning Afternoon Evening Bedtime     MG Caps   Last time this was given:  100 mg on 5/27/2017  9:18 AM        Take 100 mg by mouth 2 times a day as needed for Constipation.   Dose:  100 mg                        ibuprofen 800 MG Tabs   Last time this was given:  800 mg on 5/28/2017  2:05 AM      Commonly known as:  MOTRIN        Take 1 Tab by mouth every 8 hours as needed (For cramping after delivery; do not give if patient is receiving ketorolac (Toradol)).   Dose:  800 mg                          CONTINUE taking these medications        Instructions    Morning Afternoon Evening Bedtime    PRENATAL VITAMINS PO        Take  by mouth.                             Where to Get Your Medications      These medications were sent to [facility] [address] [place] [address] (), [place] - [address]  [address]) [place] [address]     Phone:  [phone]    -  MG Caps  - ibuprofen 800 MG Tabs            Crisis Hotline:     [place] Crisis Hotline:  0-337-KNHBHAK or 1-993.688.1121    [place] Crisis Hotline:    1-382.400.2769 or 438-289-2510        Disclaimer           _____________________________________                     __________       ________       Patient/Mother Signature or Legal                          Date                   Time

## 2017-05-26 NOTE — H&P
History and Physical      Rosalva Kruger is a 26 y.o. year old female  at 40w4d who presents for RUCs.  She had a prostin gel placed earlier this evening and is supposed to be induced at 8am.    Subjective:   positive  For CTXS.   positive Feels pain   negative for LOF  negative for vaginal bleeding.   positive for fetal movement    ROS: A comprehensive review of systems was negative.    History reviewed. No pertinent past medical history.  History reviewed. No pertinent past surgical history.  OB History    Para Term  AB SAB TAB Ectopic Multiple Living   3 1 1  1 1    1      # Outcome Date GA Lbr Elio/2nd Weight Sex Delivery Anes PTL Lv   3 Current            2 SAB 2016     SAB         Comments: No D&C   1 Term 13 40w3d  2.849 kg (6 lb 4.5 oz) M Vag-Spont EPI N Y        Social History     Social History   • Marital Status: Single     Spouse Name: N/A   • Number of Children: N/A   • Years of Education: N/A     Occupational History   • Not on file.     Social History Main Topics   • Smoking status: Never Smoker    • Smokeless tobacco: Never Used   • Alcohol Use: Yes      Comment: occ   • Drug Use: Yes     Special: Marijuana      Comment: smoked couple of weeks before finding out pregnant   • Sexual Activity:     Partners: Male     Other Topics Concern   • Not on file     Social History Narrative     Allergies: Review of patient's allergies indicates no known allergies.    Current facility-administered medications:   •  LACTATED RINGERS IV SOLN, , , ,   •  LACTATED RINGERS IV SOLN, , , ,   •  LR infusion, , Intravenous, Continuous, Corinne Ware, C.N.M., Last Rate: 125 mL/hr at 17 0154  •  fentaNYL (SUBLIMAZE) injection 50 mcg, 50 mcg, Intravenous, Q HOUR PRN, Corinne Ware, C.N.M.  •  fentaNYL (SUBLIMAZE) injection 100 mcg, 100 mcg, Intravenous, Q HOUR PRN, Corinne Ware, C.N.M.  •  [COMPLETED] penicillin G potassium injection 5 Million Units, 5 Million Units,  Intravenous, Once, 5 Million Units at 05/26/17 0103 **FOLLOWED BY** penicillin G potassium 2.5 Million Units in  mL IVPB, 2.5 Million Units, Intravenous, Q4HRS, CURLY RamseyNJONELLE    Prenatal care with [facility] starting at 12wks with following problems:  Patient Active Problem List    Diagnosis Date Noted   • Group beta Strep positive 04/28/2017     Priority: Medium   • Encounter for supervision of other normal pregnancy, third trimester 11/09/2016     Priority: Medium         Objective:      Blood pressure 117/69, pulse 76, last menstrual period 08/15/2016, SpO2 98 %.    General:   no acute distress, alert, cooperative   Skin:   normal   HEENT:  PERRLA   Lungs:   CTA bilateral   Heart:   S1, S2 normal, no murmur, click, rub or gallop, regular rate and rhythm, brisk carotid upstroke without bruits, peripheral pulses very brisk, chest is clear without rales or wheezing, no pedal edema, no JVD, no hepatosplenomegaly   Abdomen:   gravid, NT   EFW:  3200 g   Pelvis:  Exam deferred., proven to 2800 g   FHTs: 135 BPM + accels + decels min-mod variability   Contractions: 3 contractions in 10 min mod to palpation   Uterine Size: S=D   Presentations: Cephalic per RN   Cervix: Per RN    Dilation: 3cm    Effacement: 50%    Station:  -2    Consistency: Medium    Position: Middle     Complete OB US  1/10/2017 11:11 AM    HISTORY/REASON FOR EXAM:  Evaluate fetal anatomy    TECHNIQUE/EXAM DESCRIPTION: OB complete ultrasound.    COMPARISON:  None    FINDINGS:  Fetal Lie:  Breech  LMP:  8/15/2016  Clinical RIGOBERTO by LMP:  5/22/2017    Placenta (Location):  Anterior  Placenta Previa: No  Placental Grade: I    Amniotic Fluid Volume:  RAMON = 11.8 cm    Fetal Heart Rate:  136 bpm    Cervical Length:  3.65 cm transabdominal    No maternal adnexal mass is identified.    Umbilical Artery S/D Ratio(s):  Not applicable    Fetal Anatomy  (Seen or Not Seen)  Lateral Ventricles     Seen  Cisterna Magna        Seen  Cerebellum               Seen  CSP             Seen  Orbits             Seen  Face/Lips                Seen  Cord Insertion         Seen  Placental CI         Seen  4 Chamber Heart     Seen  LVOT               Seen  RVOT              Seen  Stomach       Seen  Kidneys                   Seen  Urinary Bladder      Seen  Spine                       Seen  3 Vessel Cord          Seen  Both Upper Extremities    Seen  Both Lower Extremities    Seen  Diaphragm             Seen  Movement       Seen  Gender:  Likely female    Fetal Biometry  BPD    4.61 cm, 20w 0d  HC    17.51 cm, 20w 0d  AC    15.04 cm, 20w 2d  Femur Length    3.44 cm, 20w 6d  Humerus Length  Cerebellum Diameter   2.21 cm    EGA by this US:  20w 2d  RIGOBERTO by this US: 5/28/2017  RIGOBERTO by 1st US:  Not applicable    Estimated Fetal Weight:  355 g    Comments:  Hypoechoic area present within the placenta consistent with placental lake, measuring 1.7 cm.         Impression        Single intrauterine pregnancy of an estimated gestational age of 20w 2d with an estimated date of delivery of 5/28/2017.    Fetal survey within normal limits.    Placental lake noted, of doubtful clinical significance.           Lab Review  Lab:   Blood type: O     Recent Results (from the past 5880 hour(s))   POCT Pregnancy    Collection Time: 10/18/16  1:45 PM   Result Value Ref Range    POC Urine Pregnancy Test POSITIVE Negative    Internal Control Positive Positive     Internal Control Negative Negative    POCT Urinalysis    Collection Time: 11/09/16 10:00 AM   Result Value Ref Range    POC Color Gold Negative    POC Appearance Cloudy Negative    POC Leukocyte Esterase Large Negative    POC Nitrites Negative Negative    POC Urobiligen  Negative (0.2) mg/dL    POC Protein Negative Negative mg/dL    POC Urine PH 6.0 5.0 - 8.0    POC Blood Negative Negative    POC Specific Gravity 1.015 <1.005 - >1.030    POC Ketones Negative Negative mg/dL    POC Biliruben  Negative mg/dL    POC Glucose Negative Negative mg/dL    THINPREP RFLX HPV ASCUS W/CTNG    Collection Time: 11/09/16 10:43 AM   Result Value Ref Range    Cytology Reg See Path Report     Source Cervical     C. trachomatis by PCR Negative Negative    N. gonorrhoeae by PCR Negative Negative   URINE CULTURE(NEW)    Collection Time: 12/07/16 11:39 AM   Result Value Ref Range    Significant Indicator NEG     Source UR     Urine Culture Mixed skin dino 10-50,000 cfu/mL    PREG CNTR PRENATAL PN    Collection Time: 12/07/16 11:54 AM   Result Value Ref Range    Micro Urine Req Microscopic     Color Yellow     Character Sl Cloudy (A)     Specific Gravity 1.020 <1.035    Ph 7.0 5.0-8.0    Glucose Negative Negative mg/dL    Ketones Negative Negative mg/dL    Protein Negative Negative mg/dL    Bilirubin Negative Negative    Nitrite Negative Negative    Leukocyte Esterase Large (A) Negative    Occult Blood Negative Negative    Culture Indicated Yes UA Culture    Rubella IgG Antibody 12.50 IU/mL    Syphilis, Treponemal Qual Non Reactive Non Reactive    Hepatitis B Surface Antigen Negative Negative    WBC 5.7 4.8 - 10.8 K/uL    RBC 4.44 4.20 - 5.40 M/uL    Hemoglobin 13.8 12.0 - 16.0 g/dL    Hematocrit 40.3 37.0 - 47.0 %    MCV 90.8 81.4 - 97.8 fL    MCH 31.1 27.0 - 33.0 pg    MCHC 34.2 33.6 - 35.0 g/dL    RDW 43.5 35.9 - 50.0 fL    Platelet Count 220 164 - 446 K/uL    MPV 9.3 9.0 - 12.9 fL    Neutrophils-Polys 65.50 44.00 - 72.00 %    Lymphocytes 29.70 22.00 - 41.00 %    Monocytes 3.70 0.00 - 13.40 %    Eosinophils 0.50 0.00 - 6.90 %    Basophils 0.40 0.00 - 1.80 %    Immature Granulocytes 0.20 0.00 - 0.90 %    Nucleated RBC 0.00 /100 WBC    Neutrophils (Absolute) 3.73 2.00 - 7.15 K/uL    Lymphs (Absolute) 1.69 1.00 - 4.80 K/uL    Monos (Absolute) 0.21 0.00 - 0.85 K/uL    Eos (Absolute) 0.03 0.00 - 0.51 K/uL    Baso (Absolute) 0.02 0.00 - 0.12 K/uL    Immature Granulocytes (abs) 0.01 0.00 - 0.11 K/uL    NRBC (Absolute) 0.00 K/uL   AFP TETRA    Collection Time: 12/07/16 11:54 AM    Result Value Ref Range    AFP Value -Eia 37 ng/mL    AFP MOM Value 0.86     Hcg Value 61946 IU/L    Hcg Mom 0.80     Ue3 Value 0.57 ng/mL    Ue3 Mom 0.56     Interpretation Normal     Maternal [age]IGOBERTO 24[age]4 yr    Gestational Age Based On LNMP     Gestational Age 16.29 weeks    Insulin Dependent Diabetes No     Race White     Multiple Pregnancy One     Kristi Value -Eia 323 pg/mL    Kristi Mom Value 1.66     Maternal Weight 103 lbs    Err Maternal Scrn AFP See Note    HIV ANTIBODIES    Collection Time: 12/07/16 11:54 AM   Result Value Ref Range    HIV Ag/Ab Combo Assay Non Reactive Non Reactive   OP PRENATAL PANEL-BLOOD BANK    Collection Time: 12/07/16 11:54 AM   Result Value Ref Range    ABO Grouping Only O     Rh Grouping Only POS     Antibody Screen Scrn NEG    URINE MICROSCOPIC (W/UA)    Collection Time: 12/07/16 11:54 AM   Result Value Ref Range    WBC 2-5 /hpf    Bacteria Few (A) None /hpf    Epithelial Cells Few /hpf    Mucous Threads Few /hpf    Sperm Present /hpf   GLUCOSE 1HR GESTATIONAL    Collection Time: 02/08/17 12:34 PM   Result Value Ref Range    Glucose, Post Dose 98 70 - 139 mg/dL   HCT    Collection Time: 02/08/17 12:34 PM   Result Value Ref Range    Hematocrit 40.1 37.0 - 47.0 %   HGB    Collection Time: 02/08/17 12:34 PM   Result Value Ref Range    Hemoglobin 13.1 12.0 - 16.0 g/dL   T.PALLIDUM AB EIA    Collection Time: 02/08/17 12:34 PM   Result Value Ref Range    Syphilis, Treponemal Qual Non Reactive Non Reactive   GRP B STREP, BY PCR (JONES BROTH)    Collection Time: 04/25/17  4:56 PM   Result Value Ref Range    Strep Gp B DNA PCR POSITIVE (A) Negative   CBC WITH DIFFERENTIAL    Collection Time: 05/26/17 12:45 AM   Result Value Ref Range    WBC 7.5 4.8 - 10.8 K/uL    RBC 4.06 (L) 4.20 - 5.40 M/uL    Hemoglobin 12.1 12.0 - 16.0 g/dL    Hematocrit 36.2 (L) 37.0 - 47.0 %    MCV 89.2 81.4 - 97.8 fL    MCH 29.8 27.0 - 33.0 pg    MCHC 33.4 (L) 33.6 - 35.0 g/dL    RDW 41.8 35.9 - 50.0 fL    Platelet  Count 242 164 - 446 K/uL    MPV 9.2 9.0 - 12.9 fL    Neutrophils-Polys 61.30 44.00 - 72.00 %    Lymphocytes 31.50 22.00 - 41.00 %    Monocytes 6.20 0.00 - 13.40 %    Eosinophils 0.40 0.00 - 6.90 %    Basophils 0.30 0.00 - 1.80 %    Immature Granulocytes 0.30 0.00 - 0.90 %    Nucleated RBC 0.00 /100 WBC    Neutrophils (Absolute) 4.58 2.00 - 7.15 K/uL    Lymphs (Absolute) 2.35 1.00 - 4.80 K/uL    Monos (Absolute) 0.46 0.00 - 0.85 K/uL    Eos (Absolute) 0.03 0.00 - 0.51 K/uL    Baso (Absolute) 0.02 0.00 - 0.12 K/uL    Immature Granulocytes (abs) 0.02 0.00 - 0.11 K/uL    NRBC (Absolute) 0.00 K/uL   HOLD BLOOD BANK SPECIMEN (NOT TESTED)    Collection Time: 05/26/17 12:45 AM   Result Value Ref Range    Holding Tube - Bb DONE         Assessment:   1.  IUP at 40w4d  2.  Labor status: Early latent labor.  3.  Cat 2 FHTs  4.  Obstetrical history significant for   Patient Active Problem List    Diagnosis Date Noted   • Group beta Strep positive 04/28/2017     Priority: Medium   • Encounter for supervision of other normal pregnancy, third trimester 11/09/2016     Priority: Medium   .      Plan:     Admit to L&D  GBS positive - start PCN  Routine labs  Pain management prn - pt desires epidural

## 2017-05-26 NOTE — PROGRESS NOTES
0700- Bedside report received from Tracie RN- poc discussed  0845- pt up to the bathroom no problems  0910- pt transferred to rm 300, report given to Kala RN- poc discussed

## 2017-05-26 NOTE — CARE PLAN
Problem: Potential for postpartum infection related to presence of episiotomy/vaginal tear and/or uterine contamination  Goal: Patient will be absent from signs and symptoms of infection  Outcome: PROGRESSING AS EXPECTED  Encouraged ambulation.     Problem: Alteration in comfort related to episiotomy, vaginal repair and/or after birth pains  Goal: Patient is able to ambulate, care for self and infant  Outcome: PROGRESSING AS EXPECTED  Pain medication will be given as needed. Patient preferred to call for it.

## 2017-05-26 NOTE — PROCEDURES
Delivery Note    PATIENT ID:  NAME:  Rosalva Kruger  MRN:               [id number]  YOB: 1990    Labor Course  Pt was admitted for RUCs after receiving one dose of prostin gel.  Pt progressed well.  Pt received antibiotics for her GBS+ status.    On 2017  at 06:36, this 26 y.o.,  40w4d now   , GBS positive female delivered via OA/ under epidural anesthesia a viable female infant weighing 6 lbs and 4.9 oz with APGAR scores of 8 and 9 at one and five minutes.  Tight nuchal cord x 1, unable to be reduced, clamped and cut on perineum.   Baby to maternal abdomen.  Spontaneous cry.  Mouth and nares bulb suctioned by myself.  Pitocin infusing in IVF.  Spontaneous delivery of Reeder placenta grossly intact @ 06:48.  CVx3. FF and bleeding small.  Upon vaginal exam, there was no laceration. Pt and infant are stable and bonding.  Lap count: none as none were used during the procedure.  Estimated blood loss: less than 500mL.      Corinne Ware, WILMA, APN  Dr. Veliz, attending physician

## 2017-05-27 PROCEDURE — A9270 NON-COVERED ITEM OR SERVICE: HCPCS | Performed by: NURSE PRACTITIONER

## 2017-05-27 PROCEDURE — 770002 HCHG ROOM/CARE - OB PRIVATE (112)

## 2017-05-27 PROCEDURE — 700102 HCHG RX REV CODE 250 W/ 637 OVERRIDE(OP): Performed by: NURSE PRACTITIONER

## 2017-05-27 PROCEDURE — 700112 HCHG RX REV CODE 229: Performed by: NURSE PRACTITIONER

## 2017-05-27 RX ADMIN — HYDROCODONE BITARTRATE AND ACETAMINOPHEN 1 TABLET: 10; 325 TABLET ORAL at 21:05

## 2017-05-27 RX ADMIN — HYDROCODONE BITARTRATE AND ACETAMINOPHEN 1 TABLET: 10; 325 TABLET ORAL at 09:18

## 2017-05-27 RX ADMIN — Medication 1 TABLET: at 09:18

## 2017-05-27 RX ADMIN — IBUPROFEN 800 MG: 800 TABLET, FILM COATED ORAL at 09:18

## 2017-05-27 RX ADMIN — DOCUSATE SODIUM 100 MG: 100 CAPSULE ORAL at 09:18

## 2017-05-27 RX ADMIN — HYDROCODONE BITARTRATE AND ACETAMINOPHEN 1 TABLET: 10; 325 TABLET ORAL at 16:45

## 2017-05-27 RX ADMIN — IBUPROFEN 800 MG: 800 TABLET, FILM COATED ORAL at 16:45

## 2017-05-27 RX ADMIN — HYDROCODONE BITARTRATE AND ACETAMINOPHEN 1 TABLET: 10; 325 TABLET ORAL at 13:14

## 2017-05-27 RX ADMIN — HYDROCODONE BITARTRATE AND ACETAMINOPHEN 1 TABLET: 10; 325 TABLET ORAL at 05:04

## 2017-05-27 ASSESSMENT — LIFESTYLE VARIABLES: DO YOU DRINK ALCOHOL: NO

## 2017-05-27 ASSESSMENT — PAIN SCALES - GENERAL
PAINLEVEL_OUTOF10: 0
PAINLEVEL_OUTOF10: 7
PAINLEVEL_OUTOF10: 2
PAINLEVEL_OUTOF10: 9
PAINLEVEL_OUTOF10: 7
PAINLEVEL_OUTOF10: 7
PAINLEVEL_OUTOF10: 10
PAINLEVEL_OUTOF10: 0
PAINLEVEL_OUTOF10: 0

## 2017-05-27 NOTE — PROGRESS NOTES
[facility] POSTPARTUM PROGRESS NOTE    PATIENT ID:  NAME:  Rosalva Kruger  MRN:               [id number]  YOB: 1990     26 y.o. female admitted  now  at 40w5d PPD#1 s/p     Subjective: Abdominal pain: no  Ambulating: yes  Tolerating liquids: yes  Tolerating regular diet: yes  Flatus: yes  BM: no  Bleeding: yes  Voiding: yes  Dizziness: no  Breast feeding: yes  Breast tenderness: no    Objective:    Filed Vitals:    17 0753 17 1200 17 1600 17   BP: 118/60 115/76 103/75 110/78   Pulse: 80 62 82 98   Temp:  36.7 °C (98 °F) 36.8 °C (98.2 °F) 36.8 °C (98.2 °F)   Resp:  18 18 18   Height:       Weight:       SpO2:  94% 96% 97%     General: No acute distress, resting comfortably in bed.  HEENT: normocephalic, nontraumatic, PERRLA, EOMI  Cardiovascular: Heart RRR with no murmurs, rubs or gallops. Distal Pulses 2+  Respiratory: symmetric chest expansion, lungs CTA bilaterally with no wheezes rales or rhonci  Abdomen: soft, mildly tender, fundus firm, +BS  Genitourinary: lochia light, denies excessive vaginal bleeding  Musculoskeletal: strength 5/5 in four extremities  Neuro: non focal with no numbness, tingling or changes in sensation    Recent Labs      17   0045  17   1753   WBC  7.5  11.8*   RBC  4.06*  3.62*   HEMOGLOBIN  12.1  10.7*   HEMATOCRIT  36.2*  32.7*   MCV  89.2  90.3   MCH  29.8  29.6   RDW  41.8  42.5   PLATELETCT  242  203   MPV  9.2  9.1   NEUTSPOLYS  61.30   --    LYMPHOCYTES  31.50   --    MONOCYTES  6.20   --    EOSINOPHILS  0.40   --    BASOPHILS  0.30   --      No results for input(s): SODIUM, POTASSIUM, CHLORIDE, CO2, GLUCOSE, BUN, CPKTOTAL in the last 72 hours.    Current Meds:   Current Facility-Administered Medications   Medication Dose Frequency Provider Last Rate Last Dose   • ondansetron (ZOFRAN ODT) dispertab 4 mg  4 mg Q6HRS PRN Corinne Ware, C.N.M.        Or   • ondansetron (ZOFRAN) syringe/vial injection 4 mg  4 mg Q6HRS PRN  CURLY RamseyN.M.       • metoclopramide (REGLAN) injection 10 mg  10 mg Q6HRS PRN JULISSA Ramsey.N.M.       • oxytocin (PITOCIN) infusion (for postpartum)  2,000 mL/hr Once CURLY RamseyN.SAMMY   Stopped at 17 1600    Followed by   • oxytocin (PITOCIN) infusion (for postpartum)   mL/hr Continuous CURLY RamseyN.MGiancarlo 125 mL/hr at 17 0803 125 mL/hr at 17 0803   • acetaminophen (TYLENOL) tablet 325 mg  325 mg Q4HRS PRN JULISSA Ramsey.N.M.       • ibuprofen (MOTRIN) tablet 800 mg  800 mg Q8HRS PRN JULISSA Ramsey.N.M.   800 mg at 17 2340   • hydrocodone-acetaminophen (NORCO) 5-325 MG per tablet 1 Tab  1 Tab Q4HRS PRN CURLY RamseyN.M.       • hydrocodone/acetaminophen (NORCO)  MG per tablet 1 Tab  1 Tab Q4HRS PRN JULISSA Ramsey.N.M.   1 Tab at 17 0504   • LR infusion   PRN JULISSA Ramsey.N.M.       • PRN oxytocin (PITOCIN) (20 Units/1000 mL) PRN for excessive uterine bleeding - See Admin Instr  125-999 mL/hr Once PRN JULISSA Ramsey.N.M.       • misoprostol (CYTOTEC) tablet 600 mcg  600 mcg Once PRN JULISSA Ramsey.N.M.       • methylergonovine (METHERGINE) injection 0.2 mg  0.2 mg Once PRN JULISSA Ramsey.N.M.       • carboPROST (HEMABATE) injection 250 mcg  250 mcg Once PRN Corinne Ware, JULISSA.N.M.       • docusate sodium (COLACE) capsule 100 mg  100 mg BID PRN Corinne Ware, JULISSA.N.M.       • bisacodyl (DULCOLAX) suppository 10 mg  10 mg PRN JULISSA Ramsey.N.M.       • prenatal plus vitamin (STUARTNATAL 1+1) 27-1 MG tablet 1 Tab  1 Tab CURLY HernadezNJONELLE   1 Tab at 17 1030     Last reviewed on 5/10/2017  2:10 PM by Karina Manley C.N.M.     Assessment:  26 y.o. female  at 40w5d PPD#1 s/p       Plan:   1. GBS +, adequate PPX    Continue routine postpartum care, encourage ambulation, pain control, anticipate D/C home on PPD# 2    Brannon Tate M.D.

## 2017-05-27 NOTE — CARE PLAN
Problem: Altered physiologic condition related to immediate post-delivery state and potential for bleeding/hemorrhage  Goal: Patient physiologically stable as evidenced by normal lochia, palpable uterine involution and vital signs within normal limits  Outcome: PROGRESSING AS EXPECTED  Pt's fundus remains firm with scant rubra lochia observed.  No signs of hemorrhage are present at this time.  Will continue to monitor per hospital policy.

## 2017-05-27 NOTE — CARE PLAN
Problem: Potential for postpartum infection related to presence of episiotomy/vaginal tear and/or uterine contamination  Goal: Patient will be absent from signs and symptoms of infection  Outcome: PROGRESSING AS EXPECTED  Pt's vital signs remain within defined limits.  No signs of infection are present.  Will continue to monitor per hospital policy.

## 2017-05-27 NOTE — PROGRESS NOTES
Received report from Kala Short RN; Assumed care of pt.    2100- POC discussed with pt and opportunity provided for questions.  Answers given to questions and pt verbalized understanding of information provided to her.  Denies having any further questions at this time.  No signs of distress observed in pt.  FOB at bedside for support.  Will continue to monitor per hospital policy.  Pt requests to receive pain medication on a scheduled basis.    0558- Pt nursing infant.  States has pain 10/10 due to cramping.  Pt medicated.

## 2017-05-27 NOTE — CARE PLAN
Problem: Potential for postpartum infection related to presence of episiotomy/vaginal tear and/or uterine contamination  Goal: Patient will be absent from signs and symptoms of infection  Outcome: PROGRESSING AS EXPECTED  Encouraged ambulation.     Problem: Alteration in comfort related to episiotomy, vaginal repair and/or after birth pains  Goal: Patient is able to ambulate, care for self and infant  Outcome: PROGRESSING AS EXPECTED  Will medicate for pain every four hours as requested by patient.

## 2017-05-27 NOTE — PROGRESS NOTES
Report received from Bere TRACY @ the change of shift.  Assumed care. Discussed plan of care especially on managing pain. She wants her pain medication every four hours. Assessment done. As per patient, breast feeding is doing great. Encouraged to call if with needs. Will check at intervals.

## 2017-05-28 VITALS
BODY MASS INDEX: 23.74 KG/M2 | SYSTOLIC BLOOD PRESSURE: 108 MMHG | DIASTOLIC BLOOD PRESSURE: 68 MMHG | HEART RATE: 75 BPM | HEIGHT: 62 IN | OXYGEN SATURATION: 96 % | TEMPERATURE: 98.1 F | RESPIRATION RATE: 18 BRPM | WEIGHT: 129 LBS

## 2017-05-28 PROCEDURE — A9270 NON-COVERED ITEM OR SERVICE: HCPCS | Performed by: NURSE PRACTITIONER

## 2017-05-28 PROCEDURE — 700102 HCHG RX REV CODE 250 W/ 637 OVERRIDE(OP): Performed by: NURSE PRACTITIONER

## 2017-05-28 RX ORDER — IBUPROFEN 800 MG/1
800 TABLET ORAL EVERY 8 HOURS PRN
Qty: 30 TAB | Refills: 1 | Status: SHIPPED | OUTPATIENT
Start: 2017-05-28 | End: 2019-08-30

## 2017-05-28 RX ORDER — PSEUDOEPHEDRINE HCL 30 MG
100 TABLET ORAL 2 TIMES DAILY PRN
Qty: 60 CAP | Refills: 1 | Status: SHIPPED | OUTPATIENT
Start: 2017-05-28 | End: 2019-08-30

## 2017-05-28 RX ADMIN — HYDROCODONE BITARTRATE AND ACETAMINOPHEN 1 TABLET: 10; 325 TABLET ORAL at 06:14

## 2017-05-28 RX ADMIN — HYDROCODONE BITARTRATE AND ACETAMINOPHEN 1 TABLET: 10; 325 TABLET ORAL at 02:05

## 2017-05-28 RX ADMIN — IBUPROFEN 800 MG: 800 TABLET, FILM COATED ORAL at 02:05

## 2017-05-28 RX ADMIN — Medication 1 TABLET: at 08:06

## 2017-05-28 NOTE — CARE PLAN
Problem: Discharge Barriers/Planning  Goal: Patient’s continuum of care needs will be met  DC home today    Problem: Pain Management  Goal: Pain level will decrease to patient’s comfort goal  PRN pain meds given for pain control

## 2017-05-28 NOTE — DISCHARGE INSTRUCTIONS
POSTPARTUM DISCHARGE INSTRUCTIONS FOR MOM    YOB: 1990   Age: 26 y.o.               Admit Date: 2017     Discharge Date: 2017  Attending Doctor:  Olga Veliz M.D.                  Allergies:  Review of patient's allergies indicates no known allergies.    Discharged to home by car. Discharged via wheelchair, hospital escort: Yes.  Special equipment needed: Not Applicable  Belongings with: Personal  Be sure to schedule a follow-up appointment with your primary care doctor or any specialists as instructed.     Discharge Plan:   Diet Plan: Discussed  Activity Level: Discussed  Confirmed Follow up Appointment: Patient to Call and Schedule Appointment  Confirmed Symptoms Management: Discussed  Medication Reconciliation Updated: Yes  Influenza Vaccine Indication: Patient Refuses    REASONS TO CALL YOUR OBSTETRICIAN:  1.   Persistent fever or shaking chills (Temperature higher than 100.4)  2.   Heavy bleeding (soaking more than 1 pad per hour); Passing clots  3.   Foul odor from vagina  4.   Mastitis (Breast infection; breast pain, chills, fever, redness)  5.   Urinary pain, burning or frequency  6.   Episiotomy infection  7.   Abdominal incision infection  8.   Severe depression longer than 24 hours    HAND WASHING  · Prior to handling the baby.  · Before breastfeeding or bottle feeding baby.  · After using the bathroom or changing the baby's diaper.    WOUND CARE  Ask your physician for additional care instructions.  In general:    ·  Incision:      · Keep clean and dry.    · Do NOT lift anything heavier than your baby for up to 6 weeks.    · There should not be any opening or pus.      VAGINAL CARE  · Nothing inside vagina for 6 weeks: no sexual intercourse, tampons or douching.  · Bleeding may continue for 2-4 weeks.  Amount may vary.    · Call your physician for heavy bleeding which means soaking more than 1 pad per hour    BIRTH CONTROL  · It is possible to become pregnant at  "any time after delivery and while breastfeeding.  · Plan to discuss a method of birth control with your physician at your follow up visit. visit.    DIET AND ELIMINATION  · Eating more fiber (bran cereal, fruits, and vegetables) and drinking plenty of fluids will help to avoid constipation.  · Urinary frequency after childbirth is normal.    POSTPARTUM BLUES  During the first few days after birth, you may experience a sense of the \"blues\" which may include impatience, irritability or even crying.  These feeling come and go quickly.  However, as many as 1 in 10 women experience emotional symptoms known as postpartum depression.    Postpartum depression:  May start as early as the second or third day after delivery or take several weeks or months to develop.  Symptoms of \"blues\" are present, but are more intense:  Crying spells; loss of appetite; feelings of hopelessness or loss of control; fear of touching the baby; over concern or no concern at all about the baby; little or no concern about your own appearance/caring for yourself; and/or inability to sleep or excessive sleeping.  Contact your physician if you are experiencing any of these symptoms.    Crisis Hotline:  · [place] Crisis Hotline:  4-534-VZKQXMK  Or 1-758.300.4519  · [place] Crisis Hotline:  1-682.466.6418  Or 753-356-8809    PREVENTING SHAKEN BABY:  If you are angry or stressed, PUT THE BABY IN THE CRIB, step away, take some deep breaths, and wait until you are calm to care for the baby.  DO NOT SHAKE THE BABY.  You are not alone, call a supporter for help.    · Crisis Call Center 24/7 crisis line 031-086-5243 or 1-583.494.2083  · You can also text them, text \"ANSWER\" to 637637    QUIT SMOKING/TOBACCO USE:  I understand the use of any tobacco products increases my chance of suffering from future heart disease and could cause other illnesses which may shorten my life. Quitting the use of tobacco products is the single most important thing I can do to " improve my health. For further information on smoking / tobacco cessation call a Toll Free Quit Line at 1-615.961.8643 (*National Cancer [place]) or 1-857.718.8100 (American Lung Association) or you can access the web based program at www.lungusa.org.    · [place] Tobacco Users Help Line:  (477) 833-2205       Toll Free: 1-922.492.9906  · Quit Tobacco Program [facility] (605)107-7696    DEPRESSION / SUICIDE RISK:  As you are discharged from this [facility], it is important to learn how to keep safe from harming yourself.    Recognize the warning signs:  · Abrupt changes in personality, positive or negative- including increase in energy   · Giving away possessions  · Change in eating patterns- significant weight changes-  positive or negative  · Change in sleeping patterns- unable to sleep or sleeping all the time   · Unwillingness or inability to communicate  · Depression  · Unusual sadness, discouragement and loneliness  · Talk of wanting to die  · Neglect of personal appearance   · Rebelliousness- reckless behavior  · Withdrawal from people/activities they love  · Confusion- inability to concentrate     If you or a loved one observes any of these behaviors or has concerns about self-harm, here's what you can do:  · Talk about it- your feelings and reasons for harming yourself  · Remove any means that you might use to hurt yourself (examples: pills, rope, extension cords, firearm)  · Get professional help from the community (Mental Health, Substance Abuse, psychological counseling)  · Do not be alone:Call your Safe Contact- someone whom you trust who will be there for you.  · Call your local CRISIS HOTLINE 026-6145 or 680-838-2503  · Call your local Children's Mobile Crisis Response Team [place] (476) 412-1294 or www.IOCOM  · Call the toll free National Suicide Prevention Hotlines   · National Suicide Prevention Lifeline 596-133-OKYG (5998)  · National Hope Line  [facility] 800-SUICIDE (437-6128)    DISCHARGE SURVEY:  Thank you for choosing [facility].  We hope we provided you with very good care.  You may be receiving a survey in the mail.  Please fill it out.  Your opinion is valuable to us.    ADDITIONAL EDUCATIONAL MATERIALS GIVEN TO PATIENT:        My signature on this form indicates that:  1.  I have reviewed and understand the above information  2.  My questions regarding this information have been answered to my satisfaction.  3.  I have formulated a plan with my discharge nurse to obtain my prescribed medication for home.

## 2017-05-28 NOTE — DISCHARGE SUMMARY
[facility]  NORMAL SPONTANEOUS VAGINAL DISCHARGE SUMMARY    PATIENT ID:  NAME:  Rosalva Kruger  MRN:               [id number]  YOB: 1990    DATE OF ADMISSION: 2017    DATE OF DISCHARGE: 2017     ADMITTING DIAGNOSIS: Intrauterine pregnancy at 40w6d.    DISCHARGE DIAGNOSIS: s/p     HOSPITAL COURSE: This is a 26 y.o. year old female admitted at [place] [address] at 40w6d who presented with positive contractions, no LOF, no vaginal bleeding, normal FM and in active labor. Pt was 3 cm dilated, 50% effaced and at  -1 station on sterile vaginal exam. Pregnancy was complicated by GBS bacteremia. The patient had a good labor pattern after admission and proceeded to deliver a viable female infant weighing  6 lbs and 4 oz. Infants Apgars scores were 8 and 9 at one and five minutes. The patients postpartum course was uncomplicated and she was discharged home in stable condition on postpartum day #2.    PROCEDURES PERFORMED: Normal spontaneous vaginal delivery over intact perineum  . Upon vaginal exam a no laceration was noted   COMPLICATIONS: none    DIET: regular    ACTIVITY: No intercourse and nothing inserted into the vagina for 5 weeks.    MEDICATIONS:  No current outpatient prescriptions on file.     EXAM:   General: Well appearing, NAD  Breast: No significant tenderness or erythem  CV: RRR, no m/r/g  Pulm: CTAB  Abdomen: Fundus firm, otherwise soft with + BS  Lochia: light  Ext: no significant edema, clubbing, or cyanosis      FOLLOWUP:  1) The pregnancy center in 5 weeks  2) Return to the hospital if copious vaginal bleeding or foul smelling discharge is noted    Brannon Tate M.D.

## 2017-06-30 ENCOUNTER — POST PARTUM (OUTPATIENT)
Dept: OBGYN | Facility: CLINIC | Age: 27
End: 2017-06-30
Payer: MEDICAID

## 2017-06-30 VITALS — SYSTOLIC BLOOD PRESSURE: 102 MMHG | BODY MASS INDEX: 21.03 KG/M2 | DIASTOLIC BLOOD PRESSURE: 60 MMHG | WEIGHT: 115 LBS

## 2017-06-30 DIAGNOSIS — Z34.83 ENCOUNTER FOR SUPERVISION OF OTHER NORMAL PREGNANCY, THIRD TRIMESTER: ICD-10-CM

## 2017-06-30 PROBLEM — B95.1 GROUP BETA STREP POSITIVE: Status: RESOLVED | Noted: 2017-04-28 | Resolved: 2017-06-30

## 2017-06-30 PROCEDURE — 90050 PR POSTPARTUM VISIT: CPT | Performed by: PHYSICIAN ASSISTANT

## 2017-06-30 RX ORDER — ACETAMINOPHEN AND CODEINE PHOSPHATE 120; 12 MG/5ML; MG/5ML
1 SOLUTION ORAL DAILY
Qty: 28 TAB | Refills: 11 | Status: SHIPPED | OUTPATIENT
Start: 2017-06-30 | End: 2017-07-05 | Stop reason: SDUPTHER

## 2017-06-30 NOTE — PROGRESS NOTES
Subjective:      Rosalva Kruger is a 26 y.o. female who presents with postpartum visit today. 5wk s/p  at term without complications. Pt has no complaints- denies heavy vaginal bleeding, depression, intercourse, pain or problems with BF. PAP wnl  - repeat . BCM desired is Nexplanon, but will use BCP in meantime - risks d/w pt, instructions given and rx written for Micronor. Pt urged to make appt at Gyn clinic or [facility] asap.             HPI    ROS       Objective:     /60 mmHg  Wt 52.164 kg (115 lb)  LMP 08/15/2016  Breastfeeding? Unknown     Physical Exam   Constitutional: She appears well-developed and well-nourished.   HENT:   Head: Normocephalic and atraumatic.   Eyes: Pupils are equal, round, and reactive to light.   Neck: Normal range of motion. Neck supple. No thyromegaly present.   Cardiovascular: Normal rate, regular rhythm and normal heart sounds.    Pulmonary/Chest: Effort normal and breath sounds normal. No respiratory distress.   Abdominal: Soft. Bowel sounds are normal. She exhibits no distension. There is no tenderness.   Genitourinary: Vagina normal and uterus normal. Pelvic exam was performed with patient supine. There is no rash or tenderness on the right labia. There is no rash or tenderness on the left labia. Uterus is not deviated, not enlarged and not tender. Cervix exhibits no motion tenderness. Right adnexum displays no mass and no tenderness. Left adnexum displays no mass and no tenderness. No erythema in the vagina. No foreign body around the vagina. No signs of injury around the vagina. No vaginal discharge found.   Neurological: She is alert. She has normal reflexes.   Skin: Skin is warm and dry. No erythema.   Psychiatric: She has a normal mood and affect. Her behavior is normal. Thought content normal.   Vitals reviewed.              Assessment/Plan:     1. Postpartum care following vaginal delivery  - Pt to make appt for Nexplanon with Gyn clinic asap    -  norethindrone (MICRONOR) 0.35 MG tablet; Take 1 Tab by mouth every day.  Dispense: 28 Tab; Refill: 11

## 2017-06-30 NOTE — PROGRESS NOTES
Pt here today for postpartum exam.  Delivery Date 5/26/17  Currently: breast feeding   BCM: pt would like bc unsure of what, [facility].   Good ph:[phone]  Pt states no complaints

## 2017-06-30 NOTE — MR AVS SNAPSHOT
Rosalva Kruger   2017 1:00 PM   Post Partum   MRN: [id number]    Department[facility]  [facility]   Dept Phone:  [phone]    Description:  Female : 1990   Provider:  JOANNE Christianson           Allergies as of 2017     No Known Allergies      You were diagnosed with     Postpartum care following vaginal delivery   [552189]  -  Primary     Encounter for supervision of other normal pregnancy, third trimester   [[id number]]         Vital Signs     Blood Pressure Weight Last Menstrual Period Breastfeeding? Smoking Status       102/60 mmHg 52.164 kg (115 lb) 08/15/2016 Unknown Never Smoker        Basic Information     Date Of Birth Sex Race Ethnicity Preferred Language    1990 Female White Non- English      Your appointments     Aug 07, 2017  2:00 PM   GYN Visit with MARITZA ROBINS   [facility] [address] [address] [phone]              Problem List              ICD-10-CM Priority Class Noted - Resolved    Postpartum care following vaginal delivery Z39.2   2017 - Present      Health Maintenance        Date Due Completion Dates    IMM HEP B VACCINE (1 of 3 - Primary Series) 1992 ---    IMM HEP A VACCINE (1 of 2 - Standard Series) 1993 ---    IMM HPV VACCINE (1 of 3 - Female 3 Dose Series) 2003 ---    IMM VARICELLA (CHICKENPOX) VACCINE (1 of 2 - 2 Dose Adolescent Series) 2005 ---    PAP SMEAR 2019    IMM DTaP/Tdap/Td Vaccine (3 - Td) 2027, 2013            Current Immunizations     Influenza Vaccine Quad Inj (Pf) 2013 11:30 AM    Tdap Vaccine 2017  2:22 PM, 2013 11:30 AM    Tuberculin Skin Test 3/21/2016      Below and/or attached are the medications your provider expects you to take. Review all of your home medications and newly ordered medications with your provider and/or pharmacist. Follow medication instructions as directed by your provider and/or pharmacist.  Please keep your medication list with you and share with your provider. Update the information when medications are discontinued, doses are changed, or new medications (including over-the-counter products) are added; and carry medication information at all times in the event of emergency situations     Allergies:  No Known Allergies          Medications  Valid as of: June 30, 2017 -  1:47 PM    Generic Name Brand Name Tablet Size Instructions for use    Docusate Sodium (Cap)  MG Take 100 mg by mouth 2 times a day as needed for Constipation.        Ibuprofen (Tab) MOTRIN 800 MG Take 1 Tab by mouth every 8 hours as needed (For cramping after delivery; do not give if patient is receiving ketorolac (Toradol)).        Norethindrone (Tab) MICRONOR 0.35 MG Take 1 Tab by mouth every day.        Prenatal Multivit-Min-Fe-FA   Take  by mouth.        .                 Medicines prescribed today were sent to:     [facility] [address] [place] [address] (), [place] - [address]    [address]) [place] [address]    Phone: [phone] Fax: [phone]    Open 24 Hours?: No    [facility] [address] [place] [address], [place] - [address] [facility] [address] [facility]    [address] [facility] [place] [address]    Phone: [phone] Fax: [phone]    Open 24 Hours?: No      Medication refill instructions:       If your prescription bottle indicates you have medication refills left, it is not necessary to call your provider’s office. Please contact your pharmacy and they will refill your medication.    If your prescription bottle indicates you do not have any refills left, you may request refills at any time through one of the following ways: The online Prompt.ly system (except Urgent Care), by calling your provider’s office, or by asking your pharmacy to contact your provider’s office with a refill request. Medication refills are processed only during regular business hours and may not be available until the next business  day. Your provider may request additional information or to have a follow-up visit with you prior to refilling your medication.   *Please Note: Medication refills are assigned a new Rx number when refilled electronically. Your pharmacy may indicate that no refills were authorized even though a new prescription for the same medication is available at the pharmacy. Please request the medicine by name with the pharmacy before contacting your provider for a refill.           [employer] Access Code: Activation code not generated  Current [employer] Status: Active

## 2017-07-05 RX ORDER — ACETAMINOPHEN AND CODEINE PHOSPHATE 120; 12 MG/5ML; MG/5ML
1 SOLUTION ORAL DAILY
Qty: 28 TAB | Refills: 11 | Status: SHIPPED | OUTPATIENT
Start: 2017-07-05 | End: 2019-08-30

## 2018-10-22 ENCOUNTER — NON-PROVIDER VISIT (OUTPATIENT)
Dept: OCCUPATIONAL MEDICINE | Facility: CLINIC | Age: 28
End: 2018-10-22

## 2018-10-22 DIAGNOSIS — Z11.1 ENCOUNTER FOR PPD TEST: ICD-10-CM

## 2018-10-22 PROCEDURE — 86580 TB INTRADERMAL TEST: CPT | Performed by: PREVENTIVE MEDICINE

## 2018-10-30 ENCOUNTER — NON-PROVIDER VISIT (OUTPATIENT)
Dept: OCCUPATIONAL MEDICINE | Facility: CLINIC | Age: 28
End: 2018-10-30

## 2018-10-30 DIAGNOSIS — Z11.1 ENCOUNTER FOR PPD TEST: ICD-10-CM

## 2018-10-30 PROCEDURE — 86580 TB INTRADERMAL TEST: CPT | Performed by: PREVENTIVE MEDICINE

## 2018-11-02 ENCOUNTER — NON-PROVIDER VISIT (OUTPATIENT)
Dept: OCCUPATIONAL MEDICINE | Facility: CLINIC | Age: 28
End: 2018-11-02

## 2018-11-02 LAB — TB WHEAL 3D P 5 TU DIAM: NORMAL MM

## 2018-11-02 NOTE — PROGRESS NOTES
Rosalva Kruger is a 27 y.o. female here for a non-provider visit for PPD reading -- Step 1 of 1.      1.  Resulted in Epic under enter/edit results? Yes   2.  TB evaluation questionnaire scanned into chart and original given to patient?Yes      3. Was induration greater than 0 mm? No.

## 2019-01-18 ENCOUNTER — NON-PROVIDER VISIT (OUTPATIENT)
Dept: OCCUPATIONAL MEDICINE | Facility: CLINIC | Age: 29
End: 2019-01-18

## 2019-01-18 DIAGNOSIS — Z02.1 PRE-EMPLOYMENT DRUG SCREENING: ICD-10-CM

## 2019-01-18 LAB
AMP AMPHETAMINE: NORMAL
COC COCAINE: NORMAL
INT CON NEG: NORMAL
INT CON POS: NORMAL
MET METHAMPHETAMINES: NORMAL
OPI OPIATES: NORMAL
PCP PHENCYCLIDINE: NORMAL
POC DRUG COMMENT 753798-OCCUPATIONAL HEALTH: NORMAL
THC: NORMAL

## 2019-01-18 PROCEDURE — 80305 DRUG TEST PRSMV DIR OPT OBS: CPT | Performed by: PREVENTIVE MEDICINE

## 2019-08-30 ENCOUNTER — APPOINTMENT (OUTPATIENT)
Dept: RADIOLOGY | Facility: MEDICAL CENTER | Age: 29
End: 2019-08-30
Attending: EMERGENCY MEDICINE

## 2019-08-30 ENCOUNTER — HOSPITAL ENCOUNTER (EMERGENCY)
Facility: MEDICAL CENTER | Age: 29
End: 2019-08-30
Attending: EMERGENCY MEDICINE

## 2019-08-30 VITALS
BODY MASS INDEX: 18.7 KG/M2 | RESPIRATION RATE: 18 BRPM | SYSTOLIC BLOOD PRESSURE: 103 MMHG | DIASTOLIC BLOOD PRESSURE: 66 MMHG | TEMPERATURE: 97.5 F | HEART RATE: 74 BPM | OXYGEN SATURATION: 99 % | WEIGHT: 101.63 LBS | HEIGHT: 62 IN

## 2019-08-30 DIAGNOSIS — N93.8 DYSFUNCTIONAL UTERINE BLEEDING: ICD-10-CM

## 2019-08-30 LAB
ALBUMIN SERPL BCP-MCNC: 4.3 G/DL (ref 3.2–4.9)
ALBUMIN/GLOB SERPL: 1.7 G/DL
ALP SERPL-CCNC: 42 U/L (ref 30–99)
ALT SERPL-CCNC: 10 U/L (ref 2–50)
ANION GAP SERPL CALC-SCNC: 5 MMOL/L (ref 0–11.9)
APPEARANCE UR: CLEAR
AST SERPL-CCNC: 14 U/L (ref 12–45)
BACTERIA #/AREA URNS HPF: NEGATIVE /HPF
BASOPHILS # BLD AUTO: 0.1 % (ref 0–1.8)
BASOPHILS # BLD: 0.01 K/UL (ref 0–0.12)
BILIRUB SERPL-MCNC: 0.9 MG/DL (ref 0.1–1.5)
BILIRUB UR QL STRIP.AUTO: NEGATIVE
BUN SERPL-MCNC: 12 MG/DL (ref 8–22)
C TRACH DNA SPEC QL NAA+PROBE: NEGATIVE
CALCIUM SERPL-MCNC: 8.9 MG/DL (ref 8.5–10.5)
CANDIDA DNA VAG QL PROBE+SIG AMP: NEGATIVE
CHLORIDE SERPL-SCNC: 108 MMOL/L (ref 96–112)
CO2 SERPL-SCNC: 25 MMOL/L (ref 20–33)
COLOR UR: YELLOW
CREAT SERPL-MCNC: 0.61 MG/DL (ref 0.5–1.4)
EOSINOPHIL # BLD AUTO: 0.03 K/UL (ref 0–0.51)
EOSINOPHIL NFR BLD: 0.4 % (ref 0–6.9)
EPI CELLS #/AREA URNS HPF: ABNORMAL /HPF
ERYTHROCYTE [DISTWIDTH] IN BLOOD BY AUTOMATED COUNT: 44.2 FL (ref 35.9–50)
G VAGINALIS DNA VAG QL PROBE+SIG AMP: POSITIVE
GLOBULIN SER CALC-MCNC: 2.6 G/DL (ref 1.9–3.5)
GLUCOSE SERPL-MCNC: 84 MG/DL (ref 65–99)
GLUCOSE UR STRIP.AUTO-MCNC: NEGATIVE MG/DL
HCG SERPL QL: NEGATIVE
HCT VFR BLD AUTO: 43.7 % (ref 37–47)
HGB BLD-MCNC: 14.4 G/DL (ref 12–16)
HYALINE CASTS #/AREA URNS LPF: ABNORMAL /LPF
IMM GRANULOCYTES # BLD AUTO: 0.02 K/UL (ref 0–0.11)
IMM GRANULOCYTES NFR BLD AUTO: 0.3 % (ref 0–0.9)
KETONES UR STRIP.AUTO-MCNC: NEGATIVE MG/DL
LEUKOCYTE ESTERASE UR QL STRIP.AUTO: NEGATIVE
LIPASE SERPL-CCNC: 10 U/L (ref 11–82)
LYMPHOCYTES # BLD AUTO: 1.71 K/UL (ref 1–4.8)
LYMPHOCYTES NFR BLD: 22.3 % (ref 22–41)
MCH RBC QN AUTO: 31.4 PG (ref 27–33)
MCHC RBC AUTO-ENTMCNC: 33 G/DL (ref 33.6–35)
MCV RBC AUTO: 95.2 FL (ref 81.4–97.8)
MICRO URNS: ABNORMAL
MONOCYTES # BLD AUTO: 0.3 K/UL (ref 0–0.85)
MONOCYTES NFR BLD AUTO: 3.9 % (ref 0–13.4)
N GONORRHOEA DNA SPEC QL NAA+PROBE: NEGATIVE
NEUTROPHILS # BLD AUTO: 5.6 K/UL (ref 2–7.15)
NEUTROPHILS NFR BLD: 73 % (ref 44–72)
NITRITE UR QL STRIP.AUTO: NEGATIVE
NRBC # BLD AUTO: 0 K/UL
NRBC BLD-RTO: 0 /100 WBC
PH UR STRIP.AUTO: 5 [PH] (ref 5–8)
PLATELET # BLD AUTO: 195 K/UL (ref 164–446)
PMV BLD AUTO: 9.2 FL (ref 9–12.9)
POTASSIUM SERPL-SCNC: 3.3 MMOL/L (ref 3.6–5.5)
PROT SERPL-MCNC: 6.9 G/DL (ref 6–8.2)
PROT UR QL STRIP: NEGATIVE MG/DL
RBC # BLD AUTO: 4.59 M/UL (ref 4.2–5.4)
RBC # URNS HPF: ABNORMAL /HPF
RBC UR QL AUTO: ABNORMAL
SODIUM SERPL-SCNC: 138 MMOL/L (ref 135–145)
SP GR UR STRIP.AUTO: 1.02
SPECIMEN SOURCE: NORMAL
T VAGINALIS DNA VAG QL PROBE+SIG AMP: NEGATIVE
UROBILINOGEN UR STRIP.AUTO-MCNC: 1 MG/DL
WBC # BLD AUTO: 7.7 K/UL (ref 4.8–10.8)
WBC #/AREA URNS HPF: ABNORMAL /HPF

## 2019-08-30 PROCEDURE — 84703 CHORIONIC GONADOTROPIN ASSAY: CPT

## 2019-08-30 PROCEDURE — 87660 TRICHOMONAS VAGIN DIR PROBE: CPT

## 2019-08-30 PROCEDURE — 76856 US EXAM PELVIC COMPLETE: CPT

## 2019-08-30 PROCEDURE — 87591 N.GONORRHOEAE DNA AMP PROB: CPT

## 2019-08-30 PROCEDURE — 87491 CHLMYD TRACH DNA AMP PROBE: CPT

## 2019-08-30 PROCEDURE — 83690 ASSAY OF LIPASE: CPT

## 2019-08-30 PROCEDURE — 36415 COLL VENOUS BLD VENIPUNCTURE: CPT

## 2019-08-30 PROCEDURE — 99284 EMERGENCY DEPT VISIT MOD MDM: CPT

## 2019-08-30 PROCEDURE — 85025 COMPLETE CBC W/AUTO DIFF WBC: CPT

## 2019-08-30 PROCEDURE — 87510 GARDNER VAG DNA DIR PROBE: CPT

## 2019-08-30 PROCEDURE — 80053 COMPREHEN METABOLIC PANEL: CPT

## 2019-08-30 PROCEDURE — 87480 CANDIDA DNA DIR PROBE: CPT

## 2019-08-30 PROCEDURE — 81001 URINALYSIS AUTO W/SCOPE: CPT

## 2019-08-30 NOTE — ED PROVIDER NOTES
"ED Provider Note    CHIEF COMPLAINT  Chief Complaint   Patient presents with   • Vaginal Bleeding     x 3 wks    • Epigastric Pain   • Abdominal Cramping       HPI  Rosalva Kruger is a 28 y.o. female who presents with vaginal bleeding.  Patient has a history of irregular periods.  They usually last about 7 days, but sometimes have last longer.  This time she started having bleeding about 3 weeks ago with associated lower abdominal cramping.  Nursing note states epigastric pain.  She denies any abdominal pain to be.  She has not had fever.  No dysuria or hematuria.  No flank pain.  No nausea vomiting.  She is not pregnant.  No other easy bruising or bleeding.    REVIEW OF SYSTEMS  As per HPI, otherwise a 10 point review of systems is negative    PAST MEDICAL HISTORY  As above    SOCIAL HISTORY  Social History     Tobacco Use   • Smoking status: Never Smoker   • Smokeless tobacco: Never Used   Substance Use Topics   • Alcohol use: Yes     Comment: occ   • Drug use: Yes     Types: Marijuana     Comment: smoked couple of weeks before finding out pregnant       SURGICAL HISTORY  History reviewed. No pertinent surgical history.    CURRENT MEDICATIONS  Home Medications     Reviewed by Jennifer Oswald R.N. (Registered Nurse) on 08/30/19 at 0903  Med List Status: Complete   Medication Last Dose Status        Patient Bar Taking any Medications                       ALLERGIES  No Known Allergies    PHYSICAL EXAM  VITAL SIGNS: /66   Pulse 74   Temp 36.4 °C (97.5 °F) (Temporal)   Resp 18   Ht 1.575 m (5' 2\")   Wt 46.1 kg (101 lb 10.1 oz)   LMP 08/30/2019   SpO2 99%   BMI 18.59 kg/m²    Constitutional: Awake and alert  HENT:  Atraumatic, Normocephalic.Oropharynx moist  Eyes: Normal inspection  Neck: Supple  Cardiovascular: Normal heart rate, Normal rhythm.  Symmetric peripheral pulses.   Thorax & Lungs: No respiratory distress, No wheezing, No rales, No rhonchi, No chest tenderness.   Abdomen: Bowel sounds " normal, soft, non-distended, nontender, no mass  Pelvic: External exam is normal.  There is blood in the vault.  No cervix abnormality.  No lacerations or remarkable active bleeding.  Skin: Warm, Dry, No rash.   Back: No tenderness, No CVA tenderness.   Extremities: No clubbing, cyanosis, edema, no Homans or cords   Neurologic: Grossly normal   Psychiatric: Anxious appearing    RADIOLOGY/PROCEDURES  US-PELVIC COMPLETE (TRANSABDOMINAL/TRANSVAGINAL) (COMBO)   Final Result      Normal transvaginal appearance of the pelvis.           Imaging is interpreted by radiologist    Labs:  Results for orders placed or performed during the hospital encounter of 08/30/19   CBC WITH DIFFERENTIAL   Result Value Ref Range    WBC 7.7 4.8 - 10.8 K/uL    RBC 4.59 4.20 - 5.40 M/uL    Hemoglobin 14.4 12.0 - 16.0 g/dL    Hematocrit 43.7 37.0 - 47.0 %    MCV 95.2 81.4 - 97.8 fL    MCH 31.4 27.0 - 33.0 pg    MCHC 33.0 (L) 33.6 - 35.0 g/dL    RDW 44.2 35.9 - 50.0 fL    Platelet Count 195 164 - 446 K/uL    MPV 9.2 9.0 - 12.9 fL    Neutrophils-Polys 73.00 (H) 44.00 - 72.00 %    Lymphocytes 22.30 22.00 - 41.00 %    Monocytes 3.90 0.00 - 13.40 %    Eosinophils 0.40 0.00 - 6.90 %    Basophils 0.10 0.00 - 1.80 %    Immature Granulocytes 0.30 0.00 - 0.90 %    Nucleated RBC 0.00 /100 WBC    Neutrophils (Absolute) 5.60 2.00 - 7.15 K/uL    Lymphs (Absolute) 1.71 1.00 - 4.80 K/uL    Monos (Absolute) 0.30 0.00 - 0.85 K/uL    Eos (Absolute) 0.03 0.00 - 0.51 K/uL    Baso (Absolute) 0.01 0.00 - 0.12 K/uL    Immature Granulocytes (abs) 0.02 0.00 - 0.11 K/uL    NRBC (Absolute) 0.00 K/uL   COMP METABOLIC PANEL   Result Value Ref Range    Sodium 138 135 - 145 mmol/L    Potassium 3.3 (L) 3.6 - 5.5 mmol/L    Chloride 108 96 - 112 mmol/L    Co2 25 20 - 33 mmol/L    Anion Gap 5.0 0.0 - 11.9    Glucose 84 65 - 99 mg/dL    Bun 12 8 - 22 mg/dL    Creatinine 0.61 0.50 - 1.40 mg/dL    Calcium 8.9 8.5 - 10.5 mg/dL    AST(SGOT) 14 12 - 45 U/L    ALT(SGPT) 10 2 - 50 U/L     Alkaline Phosphatase 42 30 - 99 U/L    Total Bilirubin 0.9 0.1 - 1.5 mg/dL    Albumin 4.3 3.2 - 4.9 g/dL    Total Protein 6.9 6.0 - 8.2 g/dL    Globulin 2.6 1.9 - 3.5 g/dL    A-G Ratio 1.7 g/dL   LIPASE   Result Value Ref Range    Lipase 10 (L) 11 - 82 U/L   HCG QUAL SERUM   Result Value Ref Range    Beta-Hcg Qualitative Serum Negative Negative   URINALYSIS CULTURE, IF INDICATED   Result Value Ref Range    Color Yellow     Character Clear     Specific Gravity 1.025 <1.035    Ph 5.0 5.0 - 8.0    Glucose Negative Negative mg/dL    Ketones Negative Negative mg/dL    Protein Negative Negative mg/dL    Bilirubin Negative Negative    Urobilinogen, Urine 1.0 Negative    Nitrite Negative Negative    Leukocyte Esterase Negative Negative    Occult Blood Moderate (A) Negative    Micro Urine Req Microscopic    CHLAMYDIA & GC BY PCR   Result Value Ref Range    Source Other    URINE MICROSCOPIC (W/UA)   Result Value Ref Range    WBC 0-2 /hpf    RBC 2-5 (A) /hpf    Bacteria Negative None /hpf    Epithelial Cells Few /hpf    Hyaline Cast 3-5 (A) /lpf   ESTIMATED GFR   Result Value Ref Range    GFR If African American >60 >60 mL/min/1.73 m 2    GFR If Non African American >60 >60 mL/min/1.73 m 2   VAGINAL PATHOGENS DNA PANEL   Result Value Ref Range    Candida species DNA Probe Negative Negative    Trichamonas vaginalis DNA Probe Negative Negative    Gardnerella vaginalis DNA Probe POSITIVE (A) Negative         COURSE & MEDICAL DECISION MAKING  Patient presents with dysfunctional uterine bleeding.  She is worked up extensively.  No uterine pathology.  She is not pregnant.  She does not have a UTI.  No coagulopathy.  Minimally low potassium amenable to dietary replenishment.  At this point best course is watchful waiting with referral to gynecology.  No indication for initiation of hormones at this time although this may be indicated in the future.  She is noted to have Gardnerella but no symptoms.  Defer treatment.  I have referred  her to Dr. Abrams.  Of advised return the ER for dizziness, lightheadedness, pain, uncontrolled bleeding or concern      FINAL IMPRESSION  1.  Dysfunctional uterine bleeding      This dictation was created using voice recognition software. The accuracy of the dictation is limited to the abilities of the software.  The nursing notes were reviewed and certain aspects of this information were incorporated into this note.      Electronically signed by: Daniel Sepulveda, 8/30/2019 3:00 PM

## 2019-08-30 NOTE — ED NOTES
Pt ambulatory to room with steady gait. Placed in gown, in gurney, on monitor, call light in reach, educated on plan for care. Pt assessed.

## 2019-08-30 NOTE — ED NOTES
Pt discharged home. Explained discharge instructions. Questions and comments addressed. Pt verbalized understanding of instructions. Wristband removed. Pt advised to follow-up with OB or return to ED for any new or worsening of symptoms. Pt is ambulating well and steady on feet. VS stable. self will be escorting pt home.     PIV removed and dressing applied  Pt verbalized understanding of all dc instructions.

## 2019-08-30 NOTE — ED TRIAGE NOTES
Pt to triage .  Chief Complaint   Patient presents with   • Vaginal Bleeding     x 3 wks    • Epigastric Pain   • Abdominal Cramping

## 2019-12-08 ENCOUNTER — HOSPITAL ENCOUNTER (EMERGENCY)
Dept: HOSPITAL 8 - ED | Age: 29
Discharge: HOME | End: 2019-12-08
Payer: COMMERCIAL

## 2019-12-08 VITALS — BODY MASS INDEX: 19.07 KG/M2 | WEIGHT: 103.62 LBS | HEIGHT: 62 IN

## 2019-12-08 VITALS — DIASTOLIC BLOOD PRESSURE: 92 MMHG | SYSTOLIC BLOOD PRESSURE: 121 MMHG

## 2019-12-08 DIAGNOSIS — J10.1: Primary | ICD-10-CM

## 2019-12-08 LAB — FLUBV AG SPEC QL IA: POSITIVE

## 2019-12-08 PROCEDURE — 87400 INFLUENZA A/B EACH AG IA: CPT

## 2019-12-08 PROCEDURE — 71046 X-RAY EXAM CHEST 2 VIEWS: CPT

## 2019-12-08 PROCEDURE — 99284 EMERGENCY DEPT VISIT MOD MDM: CPT

## 2019-12-08 PROCEDURE — 87081 CULTURE SCREEN ONLY: CPT

## 2019-12-08 PROCEDURE — 87880 STREP A ASSAY W/OPTIC: CPT

## 2019-12-13 NOTE — PATIENT INSTRUCTIONS
P:  1.  GBS @ 35 wks.          2.  Continue FKCs.          3.  Questions answered.          4.  Encouraged pt to tour L&D.          5.  Encourage adequate water intake.        6.  F/u 2 wks.        7.  FYI: none.          8.  US results reviewed w pt.  
unknown

## 2020-10-25 ENCOUNTER — HOSPITAL ENCOUNTER (EMERGENCY)
Facility: MEDICAL CENTER | Age: 30
End: 2020-10-25
Attending: EMERGENCY MEDICINE

## 2020-10-25 ENCOUNTER — APPOINTMENT (OUTPATIENT)
Dept: RADIOLOGY | Facility: MEDICAL CENTER | Age: 30
End: 2020-10-25
Attending: EMERGENCY MEDICINE

## 2020-10-25 VITALS
RESPIRATION RATE: 16 BRPM | DIASTOLIC BLOOD PRESSURE: 60 MMHG | HEART RATE: 86 BPM | BODY MASS INDEX: 18.62 KG/M2 | TEMPERATURE: 97 F | WEIGHT: 101.19 LBS | SYSTOLIC BLOOD PRESSURE: 103 MMHG | OXYGEN SATURATION: 98 % | HEIGHT: 62 IN

## 2020-10-25 DIAGNOSIS — R19.7 DIARRHEA, UNSPECIFIED TYPE: ICD-10-CM

## 2020-10-25 DIAGNOSIS — M54.9 PAIN, UPPER BACK: ICD-10-CM

## 2020-10-25 LAB
ALBUMIN SERPL BCP-MCNC: 4.2 G/DL (ref 3.2–4.9)
ALBUMIN/GLOB SERPL: 1.7 G/DL
ALP SERPL-CCNC: 46 U/L (ref 30–99)
ALT SERPL-CCNC: 11 U/L (ref 2–50)
ANION GAP SERPL CALC-SCNC: 9 MMOL/L (ref 7–16)
APPEARANCE UR: ABNORMAL
AST SERPL-CCNC: 17 U/L (ref 12–45)
BACTERIA #/AREA URNS HPF: ABNORMAL /HPF
BASOPHILS # BLD AUTO: 0.5 % (ref 0–1.8)
BASOPHILS # BLD: 0.02 K/UL (ref 0–0.12)
BILIRUB SERPL-MCNC: 0.7 MG/DL (ref 0.1–1.5)
BILIRUB UR QL STRIP.AUTO: NEGATIVE
BUN SERPL-MCNC: 12 MG/DL (ref 8–22)
CALCIUM SERPL-MCNC: 8.9 MG/DL (ref 8.5–10.5)
CHLORIDE SERPL-SCNC: 105 MMOL/L (ref 96–112)
CO2 SERPL-SCNC: 22 MMOL/L (ref 20–33)
COLOR UR: YELLOW
CREAT SERPL-MCNC: 0.58 MG/DL (ref 0.5–1.4)
EOSINOPHIL # BLD AUTO: 0.04 K/UL (ref 0–0.51)
EOSINOPHIL NFR BLD: 0.9 % (ref 0–6.9)
EPI CELLS #/AREA URNS HPF: ABNORMAL /HPF
ERYTHROCYTE [DISTWIDTH] IN BLOOD BY AUTOMATED COUNT: 42.8 FL (ref 35.9–50)
GLOBULIN SER CALC-MCNC: 2.5 G/DL (ref 1.9–3.5)
GLUCOSE SERPL-MCNC: 81 MG/DL (ref 65–99)
GLUCOSE UR STRIP.AUTO-MCNC: NEGATIVE MG/DL
HCG UR QL: NEGATIVE
HCT VFR BLD AUTO: 42.4 % (ref 37–47)
HGB BLD-MCNC: 14.6 G/DL (ref 12–16)
HYALINE CASTS #/AREA URNS LPF: ABNORMAL /LPF
IMM GRANULOCYTES # BLD AUTO: 0.01 K/UL (ref 0–0.11)
IMM GRANULOCYTES NFR BLD AUTO: 0.2 % (ref 0–0.9)
KETONES UR STRIP.AUTO-MCNC: NEGATIVE MG/DL
LEUKOCYTE ESTERASE UR QL STRIP.AUTO: NEGATIVE
LIPASE SERPL-CCNC: 37 U/L (ref 11–82)
LYMPHOCYTES # BLD AUTO: 1.91 K/UL (ref 1–4.8)
LYMPHOCYTES NFR BLD: 43.8 % (ref 22–41)
MCH RBC QN AUTO: 32.4 PG (ref 27–33)
MCHC RBC AUTO-ENTMCNC: 34.4 G/DL (ref 33.6–35)
MCV RBC AUTO: 94.2 FL (ref 81.4–97.8)
MICRO URNS: ABNORMAL
MONOCYTES # BLD AUTO: 0.25 K/UL (ref 0–0.85)
MONOCYTES NFR BLD AUTO: 5.7 % (ref 0–13.4)
NEUTROPHILS # BLD AUTO: 2.13 K/UL (ref 2–7.15)
NEUTROPHILS NFR BLD: 48.9 % (ref 44–72)
NITRITE UR QL STRIP.AUTO: NEGATIVE
NRBC # BLD AUTO: 0 K/UL
NRBC BLD-RTO: 0 /100 WBC
PH UR STRIP.AUTO: 5 [PH] (ref 5–8)
PLATELET # BLD AUTO: 186 K/UL (ref 164–446)
PMV BLD AUTO: 9 FL (ref 9–12.9)
POTASSIUM SERPL-SCNC: 3.7 MMOL/L (ref 3.6–5.5)
PROT SERPL-MCNC: 6.7 G/DL (ref 6–8.2)
PROT UR QL STRIP: NEGATIVE MG/DL
RBC # BLD AUTO: 4.5 M/UL (ref 4.2–5.4)
RBC # URNS HPF: ABNORMAL /HPF
RBC UR QL AUTO: ABNORMAL
SODIUM SERPL-SCNC: 136 MMOL/L (ref 135–145)
SP GR UR STRIP.AUTO: 1.02
UROBILINOGEN UR STRIP.AUTO-MCNC: 0.2 MG/DL
WBC # BLD AUTO: 4.4 K/UL (ref 4.8–10.8)
WBC #/AREA URNS HPF: ABNORMAL /HPF

## 2020-10-25 PROCEDURE — 83690 ASSAY OF LIPASE: CPT

## 2020-10-25 PROCEDURE — 96375 TX/PRO/DX INJ NEW DRUG ADDON: CPT

## 2020-10-25 PROCEDURE — 85025 COMPLETE CBC W/AUTO DIFF WBC: CPT

## 2020-10-25 PROCEDURE — 74022 RADEX COMPL AQT ABD SERIES: CPT

## 2020-10-25 PROCEDURE — 81001 URINALYSIS AUTO W/SCOPE: CPT

## 2020-10-25 PROCEDURE — 36415 COLL VENOUS BLD VENIPUNCTURE: CPT

## 2020-10-25 PROCEDURE — 80053 COMPREHEN METABOLIC PANEL: CPT

## 2020-10-25 PROCEDURE — 99284 EMERGENCY DEPT VISIT MOD MDM: CPT

## 2020-10-25 PROCEDURE — 81025 URINE PREGNANCY TEST: CPT

## 2020-10-25 PROCEDURE — 700111 HCHG RX REV CODE 636 W/ 250 OVERRIDE (IP): Performed by: EMERGENCY MEDICINE

## 2020-10-25 PROCEDURE — 96374 THER/PROPH/DIAG INJ IV PUSH: CPT

## 2020-10-25 RX ORDER — IBUPROFEN 600 MG/1
600 TABLET ORAL EVERY 6 HOURS PRN
Qty: 30 TAB | Refills: 0 | Status: SHIPPED | OUTPATIENT
Start: 2020-10-25 | End: 2023-04-21

## 2020-10-25 RX ORDER — KETOROLAC TROMETHAMINE 30 MG/ML
15 INJECTION, SOLUTION INTRAMUSCULAR; INTRAVENOUS ONCE
Status: COMPLETED | OUTPATIENT
Start: 2020-10-25 | End: 2020-10-25

## 2020-10-25 RX ORDER — PROMETHAZINE HYDROCHLORIDE 25 MG/1
25 TABLET ORAL EVERY 6 HOURS PRN
Qty: 30 TAB | Refills: 0 | Status: SHIPPED | OUTPATIENT
Start: 2020-10-25 | End: 2020-11-01

## 2020-10-25 RX ORDER — ONDANSETRON 2 MG/ML
4 INJECTION INTRAMUSCULAR; INTRAVENOUS ONCE
Status: COMPLETED | OUTPATIENT
Start: 2020-10-25 | End: 2020-10-25

## 2020-10-25 RX ORDER — CYCLOBENZAPRINE HCL 10 MG
10 TABLET ORAL 3 TIMES DAILY PRN
Qty: 30 TAB | Refills: 0 | Status: SHIPPED | OUTPATIENT
Start: 2020-10-25 | End: 2020-11-01

## 2020-10-25 RX ADMIN — ONDANSETRON 4 MG: 2 INJECTION INTRAMUSCULAR; INTRAVENOUS at 11:59

## 2020-10-25 RX ADMIN — KETOROLAC TROMETHAMINE 15 MG: 30 INJECTION, SOLUTION INTRAMUSCULAR; INTRAVENOUS at 12:02

## 2020-10-25 ASSESSMENT — FIBROSIS 4 INDEX: FIB4 SCORE: 0.61

## 2020-10-25 NOTE — ED NOTES
.Patient discharged in stable condition per orders. IV access removed - bandage applied. Wristband removed per protocol. Patient verbalized understanding of all discharge instructions. All belongings accounted for.

## 2020-10-25 NOTE — ED PROVIDER NOTES
"ED Provider Note    Scribed for Mirian Fishman M.D. by Karolina Soto. 10/25/2020  11:43 AM    Primary care provider: Pcp Pt States None  Means of arrival: Walk-In  History obtained from: Patient  History limited by: None     CHIEF COMPLAINT  Chief Complaint   Patient presents with   • Back Pain     bilateral lumbar back pain, radiating up back >1 week   • Abdominal Pain     x1 week, associated nausea       HPI  Rosalva Margarita Kruger is a 29 y.o. female who presents to the Emergency Department for bilateral lower back pain onset 1 week ago. The patient states that she has had abdominal pain for the last week, but thought it was due to menstrual cramping. She reports developing back pain that radiates up her back and experiencing mild shortness of breath. Patient adds that her worsening back pain has been making her feel nauseous, and states that she had constipation for 5 days and began having diarrhea immediately after. She says that she has also had brown discharge the past couple of days that wasn't the \"typical spotting brown discharge\" that she normally has. Patient adds that when she urinates it feels like \"it isn't all out.\" She reports after her symptoms failed to alleviate, she was prompted to come into the [facility] ED today to have her symptoms further evaluated. Tylenol was taken for alleviation, and no exacerbated factors were noted. Patient has associated diarrhea, nausea, abdominal pain, brown vaginal discharge, and shortness of breath, but denies fever, chills, dysuria, hematuria, or cough. She also denies coming into contact with anyone who has tested positive for Covid-19 or taking any recent travels outside of the country. Patient does not smoke cigarettes, but smokes marijuana daily and drinks alcohol. She has no known allergies and does not take any daily medications. Patient reports she does not have a PCP.     REVIEW OF SYSTEMS  PULMONARY: Positive for shortness of breath. No cough   BACK: " "Positive for bilateral lower back pain   GI: Positive for diarrhea, nausea, and abdominal pain   : Positive for brown vaginal discharge. No dysuria or hematuria   Endocrine: no fevers or chills    See history of present illness. All other systems are negative. C.    PAST MEDICAL HISTORY   None noted     SURGICAL HISTORY  patient denies any surgical history    SOCIAL HISTORY  Social History     Tobacco Use   • Smoking status: Never Smoker   • Smokeless tobacco: Never Used   Substance Use Topics   • Alcohol use: Yes     Comment: occasionally   • Drug use: Yes     Types: Marijuana     Comment: marijuana - daily      Social History     Substance and Sexual Activity   Drug Use Yes   • Types: Marijuana    Comment: marijuana - daily       FAMILY HISTORY  History reviewed. No pertinent family history.    CURRENT MEDICATIONS  Home Medications    **Home medications have not yet been reviewed for this encounter**         ALLERGIES  No Known Allergies    PHYSICAL EXAM  VITAL SIGNS: /70   Pulse 72   Temp 36.1 °C (97 °F) (Temporal)   Resp 14   Ht 1.575 m (5' 2\")   Wt 45.9 kg (101 lb 3.1 oz)   LMP 10/01/2020 (Approximate)   SpO2 100%   BMI 18.51 kg/m²     Constitutional: Uncomfortable, Well developed, Well nourished, No acute distress, Non-toxic appearance.   HEENT: Normocephalic, Atraumatic,  external ears normal, pharynx pink,  Mucous  Membranes moist, No rhinorrhea or mucosal edema  Eyes: PERRL, EOMI, Conjunctiva normal, No discharge.   Neck: Normal range of motion, No tenderness, Supple, No stridor.   Lymphatic: No lymphadenopathy    Cardiovascular: Regular Rate and Rhythm, No murmurs,  rubs, or gallops.   Thorax & Lungs: Lungs clear to auscultation bilaterally, No respiratory distress, No wheezes, rhales or rhonchi, No chest wall tenderness.   Abdomen: Epigastric tenderness, Bowel sounds normal, Soft, non tender, non distended,  No pulsatile masses., no rebound guarding or peritoneal signs.   Skin: Warm, Dry, " No erythema, No rash,   Back: CVA tenderness,  No spinal tenderness, bony crepitance, step offs, or instability.   Neurologic: Speaking full sentences Alert & oriented x 3, Normal motor function, Normal sensory function, No focal deficits noted. Normal reflexes. Normal Cranial Nerves.  Extremities: Equal, intact distal pulses, No cyanosis, clubbing or edema,  No tenderness.   Musculoskeletal: Good range of motion in all major joints. No tenderness to palpation or major deformities noted.     DIAGNOSTIC STUDIES / PROCEDURES    LABS  Results for orders placed or performed during the hospital encounter of 10/25/20   CBC WITH DIFFERENTIAL   Result Value Ref Range    WBC 4.4 (L) 4.8 - 10.8 K/uL    RBC 4.50 4.20 - 5.40 M/uL    Hemoglobin 14.6 12.0 - 16.0 g/dL    Hematocrit 42.4 37.0 - 47.0 %    MCV 94.2 81.4 - 97.8 fL    MCH 32.4 27.0 - 33.0 pg    MCHC 34.4 33.6 - 35.0 g/dL    RDW 42.8 35.9 - 50.0 fL    Platelet Count 186 164 - 446 K/uL    MPV 9.0 9.0 - 12.9 fL    Neutrophils-Polys 48.90 44.00 - 72.00 %    Lymphocytes 43.80 (H) 22.00 - 41.00 %    Monocytes 5.70 0.00 - 13.40 %    Eosinophils 0.90 0.00 - 6.90 %    Basophils 0.50 0.00 - 1.80 %    Immature Granulocytes 0.20 0.00 - 0.90 %    Nucleated RBC 0.00 /100 WBC    Neutrophils (Absolute) 2.13 2.00 - 7.15 K/uL    Lymphs (Absolute) 1.91 1.00 - 4.80 K/uL    Monos (Absolute) 0.25 0.00 - 0.85 K/uL    Eos (Absolute) 0.04 0.00 - 0.51 K/uL    Baso (Absolute) 0.02 0.00 - 0.12 K/uL    Immature Granulocytes (abs) 0.01 0.00 - 0.11 K/uL    NRBC (Absolute) 0.00 K/uL   COMP METABOLIC PANEL   Result Value Ref Range    Sodium 136 135 - 145 mmol/L    Potassium 3.7 3.6 - 5.5 mmol/L    Chloride 105 96 - 112 mmol/L    Co2 22 20 - 33 mmol/L    Anion Gap 9.0 7.0 - 16.0    Glucose 81 65 - 99 mg/dL    Bun 12 8 - 22 mg/dL    Creatinine 0.58 0.50 - 1.40 mg/dL    Calcium 8.9 8.5 - 10.5 mg/dL    AST(SGOT) 17 12 - 45 U/L    ALT(SGPT) 11 2 - 50 U/L    Alkaline Phosphatase 46 30 - 99 U/L    Total  Bilirubin 0.7 0.1 - 1.5 mg/dL    Albumin 4.2 3.2 - 4.9 g/dL    Total Protein 6.7 6.0 - 8.2 g/dL    Globulin 2.5 1.9 - 3.5 g/dL    A-G Ratio 1.7 g/dL   LIPASE   Result Value Ref Range    Lipase 37 11 - 82 U/L   URINALYSIS CULTURE, IF INDICATED    Specimen: Urine   Result Value Ref Range    Color Yellow     Character Cloudy (A)     Specific Gravity 1.024 <1.035    Ph 5.0 5.0 - 8.0    Glucose Negative Negative mg/dL    Ketones Negative Negative mg/dL    Protein Negative Negative mg/dL    Bilirubin Negative Negative    Urobilinogen, Urine 0.2 Negative    Nitrite Negative Negative    Leukocyte Esterase Negative Negative    Occult Blood Small (A) Negative    Micro Urine Req Microscopic    HCG QUALITATIVE UR (Lab)   Result Value Ref Range    Beta-Hcg Urine Negative Negative   ESTIMATED GFR   Result Value Ref Range    GFR If African American >60 >60 mL/min/1.73 m 2    GFR If Non African American >60 >60 mL/min/1.73 m 2   URINE MICROSCOPIC (W/UA)   Result Value Ref Range    WBC 0-2 /hpf    RBC 0-2 /hpf    Bacteria Few (A) None /hpf    Epithelial Cells Few /hpf    Hyaline Cast 0-2 /lpf      All labs reviewed by me.    RADIOLOGY  DX-ABDOMEN COMPLETE WITH AP OR PA CXR   Final Result      1.  No acute cardiopulmonary disease.   2.  Probable nipple shadow at the RIGHT lower lung.   3.  No evidence of bowel obstruction or perforation.      The radiologist's interpretation of all radiological studies have been reviewed by me.    COURSE & MEDICAL DECISION MAKING  Nursing notes, VS, PMSFHx reviewed in chart.    11:43 AM Patient seen and examined at bedside. Discussed plan of care with patient. I informed them that labs and imaging will be ordered to evaluate symptoms. The patient is understanding and agreeable with plan of care. Patient will be treated with 4 mg Zofran and 15 mg Toradol. Ordered DX-Abdomen Complete w/, Estimated GFR, CBC w/ Differential, CMP, Lipase, UA Culture If Indicated, and HCG Qualitative UR to evaluate her  symptoms. The differential diagnoses include but are not limited to: Pyelonephritis, Cholelithiasis, Gastroenteritis, Ovarian cyst    12:43 PM Ordered Urine Microscopic to evaluate the patient    2:06 PM Patient was reevaluated at bedside. Her abdominal exam was repeated which showed no abnormalities. Discussed lab and radiology results with the patient and informed them that there were no acute or abnormal findings as noted above. The plan of care was discussed with the patient. She was told she will receive medication to alleviate her pain. Patient is understanding and agreeable to the plan of care. Patient had the opportunity to ask any questions. The plan for discharge was discussed with the patient and she was told to to return for any new or worsening symptoms and to follow up with her PCP. Patient is understanding and agreeable to the plan for discharge.     PPE Note: I verified that the patient was wearing a mask and I was wearing appropriate PPE every time I entered the room. The patient's mask was on the patient at all times during my encounter except for a brief view of the oropharynx.       I reviewed prescription monitoring program for patient's narcotic use before prescribing a scheduled drug.The patient will not drink alcohol nor drive with prescribed medications. The patient will return for new or worsening symptoms and is stable at the time of discharge.    DISPOSITION:  Patient will be discharged home in stable condition.    FOLLOW UP:  [address] [address] [phone]  Call in 2 days  for recheck      OUTPATIENT MEDICATIONS:  Discharge Medication List as of 10/25/2020  2:22 PM      START taking these medications    Details   ibuprofen (MOTRIN) 600 MG Tab Take 1 Tab by mouth every 6 hours as needed., Disp-30 Tab,R-0, Normal      promethazine (PHENERGAN) 25 MG Tab Take 1 Tab by mouth every 6 hours as needed for Nausea/Vomiting., Disp-30  Tab,R-0, Normal      cyclobenzaprine (FLEXERIL) 10 mg Tab Take 1 Tab by mouth 3 times a day as needed., Disp-30 Tab,R-0, Normal              FINAL IMPRESSION  1. Pain, upper back    2. Diarrhea, unspecified type          I, Karolina Soto (Scribe), am scribing for, and in the presence of, Mirian Fishman M.D..    Electronically signed by: Karolina Soto (Scribe), 10/25/2020    I, Mirian Fishman M.D. personally performed the services described in this documentation, as scribed by Karolina Soto in my presence, and it is both accurate and complete. C    The note accurately reflects work and decisions made by me.  Mirian Fishman M.D.  10/25/2020  5:13 PM

## 2020-10-25 NOTE — ED TRIAGE NOTES
"Rosalva Kruger  Chief Complaint   Patient presents with   • Back Pain     bilateral lumbar back pain, radiating up back >1 week   • Abdominal Pain     x1 week, associated nausea     Pt ambulated to triage without difficulty. Pt reports above complaints. Pt also c/o frequent urination. Pt also reports SOB and nausea with \"severe pain\".     Patient informed of triage process and to inform staff of any changes/worsening symptoms. Pt verbalized understanding. Pt denies concerns. Pt back to waiting room.     /70   Pulse 72   Temp 36.1 °C (97 °F) (Temporal)   Resp 14   Ht 1.575 m (5' 2\")   Wt 45.9 kg (101 lb 3.1 oz)   SpO2 100%   "

## 2020-11-01 ENCOUNTER — APPOINTMENT (OUTPATIENT)
Dept: RADIOLOGY | Facility: MEDICAL CENTER | Age: 30
End: 2020-11-01
Attending: EMERGENCY MEDICINE

## 2020-11-01 ENCOUNTER — HOSPITAL ENCOUNTER (EMERGENCY)
Facility: MEDICAL CENTER | Age: 30
End: 2020-11-01
Attending: EMERGENCY MEDICINE

## 2020-11-01 VITALS
WEIGHT: 100 LBS | DIASTOLIC BLOOD PRESSURE: 71 MMHG | SYSTOLIC BLOOD PRESSURE: 122 MMHG | TEMPERATURE: 97.3 F | RESPIRATION RATE: 16 BRPM | OXYGEN SATURATION: 99 % | BODY MASS INDEX: 18.4 KG/M2 | HEIGHT: 62 IN | HEART RATE: 98 BPM

## 2020-11-01 DIAGNOSIS — M79.671 FOOT PAIN, RIGHT: ICD-10-CM

## 2020-11-01 PROCEDURE — 29515 APPLICATION SHORT LEG SPLINT: CPT | Mod: EDC

## 2020-11-01 PROCEDURE — 700111 HCHG RX REV CODE 636 W/ 250 OVERRIDE (IP): Mod: EDC | Performed by: EMERGENCY MEDICINE

## 2020-11-01 PROCEDURE — 73630 X-RAY EXAM OF FOOT: CPT | Mod: RT

## 2020-11-01 PROCEDURE — 700102 HCHG RX REV CODE 250 W/ 637 OVERRIDE(OP): Mod: EDC | Performed by: EMERGENCY MEDICINE

## 2020-11-01 PROCEDURE — 302874 HCHG BANDAGE ACE 2 OR 3"": Mod: EDC

## 2020-11-01 PROCEDURE — A9270 NON-COVERED ITEM OR SERVICE: HCPCS | Mod: EDC | Performed by: EMERGENCY MEDICINE

## 2020-11-01 PROCEDURE — 99284 EMERGENCY DEPT VISIT MOD MDM: CPT | Mod: EDC

## 2020-11-01 RX ORDER — ONDANSETRON 4 MG/1
4 TABLET, ORALLY DISINTEGRATING ORAL ONCE
Status: COMPLETED | OUTPATIENT
Start: 2020-11-01 | End: 2020-11-01

## 2020-11-01 RX ORDER — HYDROCODONE BITARTRATE AND ACETAMINOPHEN 5; 325 MG/1; MG/1
1 TABLET ORAL ONCE
Status: COMPLETED | OUTPATIENT
Start: 2020-11-01 | End: 2020-11-01

## 2020-11-01 RX ADMIN — ONDANSETRON 4 MG: 4 TABLET, ORALLY DISINTEGRATING ORAL at 08:55

## 2020-11-01 RX ADMIN — HYDROCODONE BITARTRATE AND ACETAMINOPHEN 1 TABLET: 5; 325 TABLET ORAL at 08:55

## 2020-11-01 ASSESSMENT — LIFESTYLE VARIABLES
CONSUMPTION TOTAL: POSITIVE
ON A TYPICAL DAY WHEN YOU DRINK ALCOHOL HOW MANY DRINKS DO YOU HAVE: 2
HAVE PEOPLE ANNOYED YOU BY CRITICIZING YOUR DRINKING: NO
DOES PATIENT WANT TO STOP DRINKING: NO
HAVE YOU EVER FELT YOU SHOULD CUT DOWN ON YOUR DRINKING: NO
TOTAL SCORE: 0
DO YOU DRINK ALCOHOL: YES
HOW MANY TIMES IN THE PAST YEAR HAVE YOU HAD 5 OR MORE DRINKS IN A DAY: 1
EVER FELT BAD OR GUILTY ABOUT YOUR DRINKING: NO
EVER HAD A DRINK FIRST THING IN THE MORNING TO STEADY YOUR NERVES TO GET RID OF A HANGOVER: NO
TOTAL SCORE: 0
AVERAGE NUMBER OF DAYS PER WEEK YOU HAVE A DRINK CONTAINING ALCOHOL: 1
TOTAL SCORE: 0

## 2020-11-01 ASSESSMENT — FIBROSIS 4 INDEX: FIB4 SCORE: 0.74

## 2020-11-01 NOTE — ED PROVIDER NOTES
"ED Provider Note      CHIEF COMPLAINT   Chief Complaint   Patient presents with   • Foot Pain     right, twisted foot on steps swelling noted.       HPI   Rosalva Kruger is a 29 y.o. female who presents with right foot pain.  Walking down steps and missed a step.  Twisted her right foot.  Describes forceful inversion of the ankle.  Denies ankle pain normal proximal lower extremity pain.  Denies other trauma.  Pain throbbing severe located dorsal right foot.  No numbness tingling weakness.  Unable to bear weight.    REVIEW OF SYSTEMS   Pertinent negative: As per HPI    PAST MEDICAL HISTORY   History reviewed. No pertinent past medical history.    SOCIAL HISTORY  Social History     Tobacco Use   • Smoking status: Never Smoker   • Smokeless tobacco: Never Used   Substance Use Topics   • Alcohol use: Yes     Comment: occasionally   • Drug use: Yes     Types: Marijuana     Comment: marijuana - daily       ALLERGIES   See chart    PHYSICAL EXAM  VITAL SIGNS: /71   Pulse (!) 103   Temp 37 °C (98.6 °F) (Oral)   Resp 16   Ht 1.575 m (5' 2\")   Wt 45.4 kg (100 lb)   LMP 10/30/2020   SpO2 97%   BMI 18.29 kg/m²   Head: Atraumatic  Eyes: Eyes normal inspection  Neck: has full range of motion, normal inspection.  Constitutional: No acute distress   Cardiovascular: Normal heart rate. Pulses strong dorsalis pedis  Thorax & Lungs: No respiratory distress  Skin: Intact  Musculoskeletal: There is swelling and tenderness over the dorsal proximal right foot.  No tenderness of the ankle and more proximal right lower extremity.  Other extremities without trauma.  Compartments soft.  Neurologic:  Normal sensory and motor    RADIOLOGY/PROCEDURES  DX-FOOT-COMPLETE 3+ RIGHT   Final Result      Negative for right foot fracture or malalignment         Imaging is interpreted by radiologist reviewed by me    COURSE & MEDICAL DECISION MAKING  Analgesia for possible fracture-Norco    Patient presents with trauma and swelling of " the right dorsal midfoot.  Obtained x-ray which is negative.  I have concern for occult fracture or significant ligamentous injury.  I will place her in a posterior short leg splint and on crutches.  Referred to Dr. Vergara.  Advise rest ice elevate.  Tylenol and/or ibuprofen as needed for pain.    FINAL IMPRESSION  1.  Right foot sprain, rule out occult fracture      This dictation was created using voice recognition software. The accuracy of the dictation is limited to the abilities of the software. I expect there may be some errors of grammar and possibly content. The nursing notes were reviewed and certain aspects of this information were incorporated into this note.      Electronically signed by: Daniel Sepulveda M.D., 11/1/2020 9:48 AM

## 2020-11-01 NOTE — Clinical Note
Rosalva Kruger was seen and treated in our emergency department on 11/1/2020.  She may return to work on 11/03/2020.       If you have any questions or concerns, please don't hesitate to call.      Daniel Sepulveda M.D.

## 2020-11-01 NOTE — ED NOTES
[facility]    Discharge instructions, which include signs and symptoms to monitor patient for, hydration and hand hygiene importance, as well as detailed information regarding foot pain provided.  This RN also encouraged a follow- up appointment to be made with patient's PCP and Ortho.  All questions and concerns addressed at this time.        Patient leaves ER in no apparent distress, is awake, alert and appropriate. Patient is aware of the need to return to the ER for any concerns or changes in current condition.

## 2020-11-01 NOTE — ED TRIAGE NOTES
Rosalva Kruger  Chief Complaint   Patient presents with   • Foot Pain     right, twisted foot on steps swelling noted.     Pt to triage in w/c with above complaint. VSS, no acute ditress. Pt states was drinking last night and does not remember events, but was told that she hurt her foot after twisting it while walking down steps.  Swelling noted.  Pt/staff masked and in appropriate PPE during encounter.   Pt returned to lob, educated on triage process, and to inform staff of any changes or concerns.

## 2020-11-01 NOTE — ED NOTES
Pt to room 40. Reviewed and agree with triage note. Pt states that she hurt her right foot last night, while drinking. Pt has obvious swelling and bruising noted to right top of foot. Pt reports that she is unable to bear weight. Pt provided hospital gown, provided warm blanket and call light within reach. Chart up for ERP

## 2020-11-05 ENCOUNTER — NON-PROVIDER VISIT (OUTPATIENT)
Dept: OCCUPATIONAL MEDICINE | Facility: CLINIC | Age: 30
End: 2020-11-05

## 2020-11-05 DIAGNOSIS — Z11.1 ENCOUNTER FOR PPD TEST: Primary | ICD-10-CM

## 2020-11-05 PROCEDURE — 86580 TB INTRADERMAL TEST: CPT | Performed by: NURSE PRACTITIONER

## 2020-11-07 ENCOUNTER — NON-PROVIDER VISIT (OUTPATIENT)
Dept: URGENT CARE | Facility: CLINIC | Age: 30
End: 2020-11-07

## 2020-11-07 LAB — TB WHEAL 3D P 5 TU DIAM: NORMAL MM

## 2021-02-15 ENCOUNTER — OFFICE VISIT (OUTPATIENT)
Dept: URGENT CARE | Facility: CLINIC | Age: 31
End: 2021-02-15
Payer: COMMERCIAL

## 2021-02-15 VITALS
HEIGHT: 62 IN | RESPIRATION RATE: 14 BRPM | DIASTOLIC BLOOD PRESSURE: 60 MMHG | SYSTOLIC BLOOD PRESSURE: 110 MMHG | OXYGEN SATURATION: 99 % | HEART RATE: 82 BPM | WEIGHT: 102 LBS | TEMPERATURE: 97.3 F | BODY MASS INDEX: 18.77 KG/M2

## 2021-02-15 DIAGNOSIS — H10.9 BACTERIAL CONJUNCTIVITIS OF LEFT EYE: ICD-10-CM

## 2021-02-15 PROCEDURE — 99203 OFFICE O/P NEW LOW 30 MIN: CPT | Performed by: PHYSICIAN ASSISTANT

## 2021-02-15 RX ORDER — POLYMYXIN B SULFATE AND TRIMETHOPRIM 1; 10000 MG/ML; [USP'U]/ML
1 SOLUTION OPHTHALMIC EVERY 4 HOURS
Qty: 10 ML | Refills: 0 | Status: SHIPPED | OUTPATIENT
Start: 2021-02-15 | End: 2021-02-22

## 2021-02-15 ASSESSMENT — ENCOUNTER SYMPTOMS
BLURRED VISION: 0
EYE REDNESS: 1
VOMITING: 0
CONSTITUTIONAL NEGATIVE: 1
FOREIGN BODY SENSATION: 0
DOUBLE VISION: 0
EYE DISCHARGE: 1
PHOTOPHOBIA: 0
EYE PAIN: 0

## 2021-02-15 ASSESSMENT — VISUAL ACUITY: OU: 1

## 2021-02-15 ASSESSMENT — FIBROSIS 4 INDEX: FIB4 SCORE: 0.77

## 2021-02-15 NOTE — LETTER
February 15, 2021         Patient: Rosalva Kruger   YOB: 1990   Date of Visit: 2/15/2021           To Whom it May Concern:    Rosalva Kruger was seen in my clinic on 2/15/2021. She may return to work on Weds. Feb.17th.    If you have any questions or concerns, please don't hesitate to call.        Sincerely,           Jerome Turner P.A.-C.  Electronically Signed

## 2021-02-16 NOTE — PROGRESS NOTES
Subjective:      Rosalva Kruger is a 30 y.o. female who presents with Conjunctivitis (Left side that started last night)            Eye Problem   The left eye is affected. This is a new problem. The current episode started yesterday. The problem occurs constantly. The problem has been unchanged. There was no injury mechanism. The patient is experiencing no pain. There is no known exposure to pink eye. She does not wear contacts. Associated symptoms include an eye discharge and eye redness. Pertinent negatives include no blurred vision, double vision, foreign body sensation, photophobia, recent URI or vomiting.       PMH:  has no past medical history of Addisons disease ([facility]), Adrenal disorder, Allergy, Anemia, Anxiety, Arrhythmia, Arthritis, ASTHMA, Blood transfusion, Cancer ([facility]), CATARACT, CHF (congestive heart failure) ([facility]), Clotting disorder, COPD, Cushings syndrome, Depression, Diabetes, Diabetic neuropathy ([facility]), EMPHYSEMA, GERD (gastroesophageal reflux disease), Glaucoma, Goiter, Headache(784.0), Heart attack ([facility]), Heart murmur, HIV (human immunodeficiency virus infection), Hyperlipidemia, Hypertension, IBD (inflammatory bowel disease), Meningitis, Migraine, Muscle disorder, OSTEOPOROSIS, Parathyroid disorder ([facility]), Pituitary disease ([facility]), Seizure ([facility]), Sickle cell disease ([facility]), Stroke ([facility]), Substance abuse ([facility]), Thyroid disease, Tuberculosis, Ulcer, or Urinary tract infection, site not specified.  MEDS:   Current Outpatient Medications:   •  polymixin-trimethoprim (POLYTRIM) 03724-9.1 UNIT/ML-% Solution, Administer 1 Drop into both eyes every 4 hours for 7 days., Disp: 10 mL, Rfl: 0  •  ibuprofen (MOTRIN) 600 MG Tab, Take 1 Tab by mouth every 6 hours as needed. (Patient not taking: Reported on 2/15/2021), Disp: 30 Tab, Rfl: 0  ALLERGIES: No Known Allergies  SURGHX: No past surgical history on file.  SOCHX:  reports that she has never smoked. She has never used smokeless tobacco. She reports  "current alcohol use. She reports previous drug use. Drug: Marijuana.  FH: family history is not on file.    Review of Systems   Constitutional: Negative.    HENT: Negative.    Eyes: Positive for discharge and redness. Negative for blurred vision, double vision, photophobia and pain.   Gastrointestinal: Negative for vomiting.       Medications, Allergies, and current problem list reviewed today in Epic     Objective:     /60   Pulse 82   Temp 36.3 °C (97.3 °F) (Temporal)   Resp 14   Ht 1.575 m (5' 2\")   Wt 46.3 kg (102 lb)   SpO2 99%   BMI 18.66 kg/m²      Physical Exam  Vitals and nursing note reviewed.   Constitutional:       General: She is not in acute distress.     Appearance: She is well-developed. She is not diaphoretic.   HENT:      Head: Normocephalic and atraumatic.   Eyes:      General: Lids are normal. Vision grossly intact.         Right eye: No discharge.         Left eye: Discharge present.     Extraocular Movements: Extraocular movements intact.      Conjunctiva/sclera:      Left eye: Left conjunctiva is injected. Exudate present.      Pupils: Pupils are equal, round, and reactive to light.   Cardiovascular:      Rate and Rhythm: Normal rate and regular rhythm.      Heart sounds: Normal heart sounds.   Pulmonary:      Effort: Pulmonary effort is normal. No respiratory distress.      Breath sounds: Normal breath sounds. No wheezing.   Musculoskeletal:      Cervical back: Normal range of motion and neck supple.   Skin:     General: Skin is warm and dry.   Neurological:      Mental Status: She is alert and oriented to person, place, and time.   Psychiatric:         Behavior: Behavior normal.         Thought Content: Thought content normal.         Judgment: Judgment normal.                 Assessment/Plan:         1. Bacterial conjunctivitis of left eye  polymixin-trimethoprim (POLYTRIM) 79563-2.1 UNIT/ML-% Solution     No pain or vision changes  OTC meds and conservative measures as " discussed    Return to clinic or go to ED if symptoms worsen or persist. Indications for ED discussed at length. Patient/Parent/Guardian voices understanding. Follow-up with your primary care provider in 3-5 days. Red flag symptoms discussed. All side effects of medication discussed including allergic response, GI upset, tendon injury, rash, sedation etc.    Please note that this dictation was created using voice recognition software. I have made every reasonable attempt to correct obvious errors, but I expect that there are errors of grammar and possibly content that I did not discover before finalizing the note.

## 2021-03-31 ENCOUNTER — OFFICE VISIT (OUTPATIENT)
Dept: URGENT CARE | Facility: CLINIC | Age: 31
End: 2021-03-31
Payer: COMMERCIAL

## 2021-03-31 VITALS
DIASTOLIC BLOOD PRESSURE: 60 MMHG | TEMPERATURE: 98.4 F | OXYGEN SATURATION: 96 % | BODY MASS INDEX: 19.32 KG/M2 | SYSTOLIC BLOOD PRESSURE: 110 MMHG | WEIGHT: 105 LBS | RESPIRATION RATE: 14 BRPM | HEART RATE: 68 BPM | HEIGHT: 62 IN

## 2021-03-31 DIAGNOSIS — H65.192 ACUTE MIDDLE EAR EFFUSION, LEFT: ICD-10-CM

## 2021-03-31 DIAGNOSIS — J02.9 PHARYNGITIS, UNSPECIFIED ETIOLOGY: ICD-10-CM

## 2021-03-31 DIAGNOSIS — K04.7 DENTAL INFECTION: ICD-10-CM

## 2021-03-31 LAB
INT CON NEG: NORMAL
INT CON POS: NORMAL
S PYO AG THROAT QL: NEGATIVE

## 2021-03-31 PROCEDURE — 87880 STREP A ASSAY W/OPTIC: CPT | Performed by: NURSE PRACTITIONER

## 2021-03-31 PROCEDURE — 99214 OFFICE O/P EST MOD 30 MIN: CPT | Performed by: NURSE PRACTITIONER

## 2021-03-31 RX ORDER — AMOXICILLIN AND CLAVULANATE POTASSIUM 875; 125 MG/1; MG/1
1 TABLET, FILM COATED ORAL 2 TIMES DAILY
Qty: 20 TABLET | Refills: 0 | Status: SHIPPED | OUTPATIENT
Start: 2021-03-31 | End: 2021-04-10

## 2021-03-31 RX ORDER — IBUPROFEN 600 MG/1
600 TABLET ORAL EVERY 6 HOURS PRN
Qty: 30 TABLET | Refills: 0 | Status: SHIPPED | OUTPATIENT
Start: 2021-03-31 | End: 2023-04-21

## 2021-03-31 ASSESSMENT — FIBROSIS 4 INDEX: FIB4 SCORE: 0.77

## 2021-03-31 ASSESSMENT — ENCOUNTER SYMPTOMS
SHORTNESS OF BREATH: 0
HEADACHES: 1

## 2021-04-01 NOTE — PROGRESS NOTES
Subjective:     Rosalva Kruger is a 30 y.o. female who presents for Pharyngitis (sore throat x4 days) and Oral Swelling (painful swelling in the jaw area past the last molar in the tooth)      Left dental pain, 10/10. States she's had an issue with her wisdom teeth. Pain in area with opening mouth. Mild sore throat. Hoarsness in voice x 3 days. Has had COVID vaccine. Nasal congestion last week. Occasonal HA. Seasonal allergies. States throat pain not as bad as strep. The week before last was vomitting, 24 hrs. Daughter also had it.  Has had COVID vaccine.    Pharyngitis   This is a new problem. The current episode started in the past 7 days. The pain is worse on the left side. There has been no fever. Associated symptoms include congestion, ear pain and headaches. Pertinent negatives include no coughing, drooling, shortness of breath or stridor.   Dental Pain   This is a recurrent problem. Pertinent negatives include no difficulty swallowing or fever.       History reviewed. No pertinent past medical history.    History reviewed. No pertinent surgical history.    Social History     Socioeconomic History   • Marital status: Single     Spouse name: Not on file   • Number of children: Not on file   • Years of education: Not on file   • Highest education level: Not on file   Occupational History   • Not on file   Tobacco Use   • Smoking status: Never Smoker   • Smokeless tobacco: Never Used   Substance and Sexual Activity   • Alcohol use: Yes     Comment: occasionally   • Drug use: Not Currently     Types: Marijuana     Comment: marijuana - daily   • Sexual activity: Yes     Partners: Female   Other Topics Concern   • Not on file   Social History Narrative   • Not on file     Social Determinants of Health     Financial Resource Strain:    • Difficulty of Paying Living Expenses:    Food Insecurity:    • Worried About Running Out of Food in the Last Year:    • Ran Out of Food in the Last Year:    Transportation  "Needs:    • Lack of Transportation (Medical):    • Lack of Transportation (Non-Medical):    Physical Activity:    • Days of Exercise per Week:    • Minutes of Exercise per Session:    Stress:    • Feeling of Stress :    Social Connections:    • Frequency of Communication with Friends and Family:    • Frequency of Social Gatherings with Friends and Family:    • Attends [employer] Services:    • Active Member of Clubs or Organizations:    • Attends Club or Organization Meetings:    • Marital Status:    Intimate Partner Violence:    • Fear of Current or Ex-Partner:    • Emotionally Abused:    • Physically Abused:    • Sexually Abused:         History reviewed. No pertinent family history.     No Known Allergies    Review of Systems   Constitutional: Negative for fever.   HENT: Positive for congestion, ear pain and sore throat. Negative for drooling.    Respiratory: Negative for cough, shortness of breath and stridor.    Neurological: Positive for headaches. Negative for sensory change, speech change, focal weakness and weakness.   Endo/Heme/Allergies: Positive for environmental allergies.   All other systems reviewed and are negative.       Objective:   /60   Pulse 68   Temp 36.9 °C (98.4 °F) (Temporal)   Resp 14   Ht 1.575 m (5' 2\")   Wt 47.6 kg (105 lb)   SpO2 96%   BMI 19.20 kg/m²     Physical Exam  Vitals reviewed.   Constitutional:       General: She is not in acute distress.     Appearance: She is well-developed.   HENT:      Head: Normocephalic and atraumatic.      Jaw: Trismus and pain on movement present. No swelling.      Right Ear: Tympanic membrane, ear canal and external ear normal.      Left Ear: External ear normal. A middle ear effusion is present. No mastoid tenderness. Tympanic membrane is not perforated or erythematous.      Ears:      Comments: Cloudy effusion.     Nose: Mucosal edema present.      Mouth/Throat:      Lips: Pink.      Mouth: Mucous membranes are moist. No angioedema.      " Dentition: Dental tenderness present.      Pharynx: Oropharynx is clear. Uvula midline.      Tonsils: No tonsillar abscesses. 1+ on the right. 1+ on the left.        Comments: No soft palpate edema. No tongue swelling. No tenderness of induration of submandibular area. No neck edema or erythema.         Eyes:      Conjunctiva/sclera: Conjunctivae normal.   Cardiovascular:      Rate and Rhythm: Normal rate.   Pulmonary:      Effort: Pulmonary effort is normal. No respiratory distress.      Breath sounds: Normal breath sounds.   Musculoskeletal:         General: Normal range of motion.      Cervical back: Normal range of motion and neck supple. No edema, erythema, rigidity, tenderness or crepitus. No pain with movement. Normal range of motion.   Lymphadenopathy:      Head:      Right side of head: No submental, submandibular, tonsillar, preauricular, posterior auricular or occipital adenopathy.      Left side of head: No submental, submandibular, tonsillar, preauricular, posterior auricular or occipital adenopathy.   Skin:     General: Skin is warm and dry.      Findings: No rash.   Neurological:      General: No focal deficit present.      Mental Status: She is alert and oriented to person, place, and time.      GCS: GCS eye subscore is 4. GCS verbal subscore is 5. GCS motor subscore is 6.   Psychiatric:         Mood and Affect: Mood normal.         Speech: Speech normal.         Behavior: Behavior normal.         Thought Content: Thought content normal.         Judgment: Judgment normal.         Assessment/Plan:   1. Dental infection  - amoxicillin-clavulanate (AUGMENTIN) 875-125 MG Tab; Take 1 tablet by mouth 2 times a day for 10 days.  Dispense: 20 tablet; Refill: 0  - ibuprofen (MOTRIN) 600 MG Tab; Take 1 tablet by mouth every 6 hours as needed.  Dispense: 30 tablet; Refill: 0    2. Pharyngitis, unspecified etiology  - POCT Rapid Strep A    3. Acute middle ear effusion, left    Results for orders placed or  performed in visit on 03/31/21   POCT Rapid Strep A   Result Value Ref Range    Rapid Strep Screen Negative     Internal Control Positive Valid     Internal Control Negative Valid    -Sudafed for left ear.  -OTC allergy medication.  -Oral hydration.  -Ice pack for pain and swelling.  -Tylenol and NSAIDs (Ibuprofen) for pain and inflammation.  -Dental Hygiene  -Reduce sugar-rich foods or beverages.     Follow up with dentist. Emergently for worsening symptoms, increased signs of infection, fever, swelling to neck or throat, difficulty swallowing, increasing difficulty opening mouth, shortness of breath. Discussed ER follow up for worsening symptoms, as CT imaging may be warranted at the time to r/o deeper soft tissue infection.    Differential diagnosis, natural history, supportive care, and indications for immediate follow-up discussed.

## 2021-04-01 NOTE — PATIENT INSTRUCTIONS
Dental Abscess    A dental abscess is a collection of pus in or around a tooth that results from an infection. An abscess can cause pain in the affected area as well as other symptoms. Treatment is important to help with symptoms and to prevent the infection from spreading.  What are the causes?  This condition is caused by a bacterial infection around the root of the tooth that involves the inner part of the tooth (pulp). It may result from:  · Severe tooth decay.  · Trauma to the tooth, such as a broken or chipped tooth, that allows bacteria to enter into the pulp.  · Severe gum disease around a tooth.  What increases the risk?  This condition is more likely to develop in males. It is also more likely to develop in people who:  · Have dental decay (cavities).  · Eat sugary snacks between meals.  · Use tobacco products.  · Have diabetes.  · Have a weakened disease-fighting system (immune system).  · Do not brush and care for their teeth regularly.  What are the signs or symptoms?  Symptoms of this condition include:  · Severe pain in and around the infected tooth.  · Swelling and redness around the infected tooth, in the mouth, or in the face.  · Tenderness.  · Pus drainage.  · Bad breath.  · Bitter taste in the mouth.  · Difficulty swallowing.  · Difficulty opening the mouth.  · Nausea.  · Vomiting.  · Chills.  · Swollen neck glands.  · Fever.  How is this diagnosed?  This condition is diagnosed based on:  · Your symptoms and your medical and dental history.  · An examination of the infected tooth. During the exam, your dentist may tap on the infected tooth.  You may also have X-rays of the affected area.  How is this treated?  This condition is treated by getting rid of the infection. This may be done with:  · Incision and drainage. This procedure is done by making an incision in the abscess to drain out the pus. Removing pus is the first priority in treating an abscess.  · Antibiotic medicines. These may be used  in certain situations.  · Antibacterial mouth rinse.  · A root canal. This may be performed to save the tooth. Your dentist accesses the visible part of your tooth (crown) with a drill and removes any damaged pulp. Then the space is filled and sealed off.  · Tooth extraction. The tooth is pulled out if it cannot be saved by other treatment.  You may also receive treatment for pain, such as:  · Acetaminophen or NSAIDs.  · Gels that contain a numbing medicine.  · An injection to block the pain near your nerve.  Follow these instructions at home:  Medicines  · Take over-the-counter and prescription medicines only as told by your dentist.  · If you were prescribed an antibiotic, take it as told by your dentist. Do not stop taking the antibiotic even if you start to feel better.  · If you were prescribed a gel that contains a numbing medicine, use it exactly as told in the directions. Do not use these gels for children who are younger than 2 years of age.  · Do not drive or use heavy machinery while taking prescription pain medicine.  General instructions  · Rinse out your mouth often with salt water to relieve pain or swelling. To make a salt-water mixture, completely dissolve ½-1 tsp of salt in 1 cup of warm water.  · Eat a soft diet while your abscess is healing.  · Drink enough fluid to keep your urine pale yellow.  · Do not apply heat to the outside of your mouth.  · Do not use any products that contain nicotine or tobacco, such as cigarettes and e-cigarettes. If you need help quitting, ask your health care provider.  · Keep all follow-up visits as told by your dentist. This is important.  How is this prevented?  · Brush your teeth every morning and night with fluoride toothpaste. Floss one time each day.  · Get regularly scheduled dental cleanings.  · Consider having a dental sealant applied on teeth that have deep holes (caries).  · Drink fluoridated water regularly. This includes most tap water. Check the label  on bottled water to see if it contains fluoride.  · Drink water instead of sugary drinks.  · Eat healthy meals and snacks.  · Wear a mouth guard or face shield to protect your teeth while playing sports.  Contact a health care provider if:  · Your pain is worse and is not helped by medicine.  Get help right away if:  · You have a fever or chills.  · Your symptoms suddenly get worse.  · You have a very bad headache.  · You have problems breathing or swallowing.  · You have trouble opening your mouth.  · You have swelling in your neck or around your eye.  Summary  · A dental abscess is a collection of pus in or around a tooth that results from an infection.  · A dental abscess may result from severe tooth decay, trauma to the tooth, or severe gum disease around a tooth.  · Symptoms include severe pain, swelling, redness, and drainage of pus in and around the infected tooth.  · The first priority in treating a dental abscess is to drain out the pus. Treatment may also involve removing damage inside the tooth (root canal) or pulling out (extracting) the tooth.  This information is not intended to replace advice given to you by your health care provider. Make sure you discuss any questions you have with your health care provider.  Document Released: 12/18/2006 Document Revised: 11/30/2018 Document Reviewed: 08/20/2018  [employer] Patient Education © 2020 [employer]        Peritonsillar Abscess    A peritonsillar abscess is a collection of pus in the back of the throat, behind the tonsils. It usually occurs when an infection of the throat or tonsils (tonsillitis) spreads into the tissues around the tonsils.  What are the causes?  The infection that leads to a peritonsillar abscess is usually caused by streptococcal bacteria.  What increases the risk?  You are more likely to develop this condition if:  · You have recently been diagnosed with an infection in your mouth or throat.  · You smoke.  · You have gum disease or  gingivitis (periodontal disease).  What are the signs or symptoms?  Symptoms of this condition include:  · A sore throat, often with pain on just one side.  · Swollen, tender glands (lymph nodes) in the neck.  · Difficulty swallowing.  · Difficulty opening your mouth.  · Fever.  · Chills.  · Drooling because of difficulty swallowing saliva.  · Headache.  · Changes in how your voice sounds.  · Bad breath.  How is this diagnosed?  This condition may be diagnosed based on:  · Your symptoms and medical history.  · A physical exam.  · Imaging tests, such as ultrasound or CT scan.  · Testing a pus sample from the abscess. Your health care provider may collect a pus sample by swabbing the back of your throat or by removing some pus with a syringe and needle (needle aspiration).  How is this treated?  Treatment usually involves draining the pus from the abscess. This may be done through needle aspiration or by making an incision in the abscess and draining the fluid. You will also likely need to take antibiotic medicine.  Follow these instructions at home:  Medicines  · Take over-the-counter and prescription medicines only as told by your health care provider.  · If you were prescribed an antibiotic, take it as told by your health care provider. Do not stop taking the antibiotic even if your condition improves.  Eating and drinking    · Drink enough fluid to keep your urine pale yellow.  · While your throat is sore, try only drinking liquids or eating only soft-textured foods such as yogurt and ice cream.  General instructions  · Rest as much as possible and get plenty of sleep.  · Return to your normal activities as told by your health care provider. Ask your health care provider what activities are safe for you.  · If your abscess was drained, gargle with a salt-water mixture 3-4 times a day or as needed. To make a salt-water mixture, completely dissolve ½-1 tsp of salt in 1 cup of warm water. Do not swallow this  mixture.  · Do not use any products that contain nicotine or tobacco, such as cigarettes and e-cigarettes. If you need help quitting, ask your health care provider.  · Keep all follow-up visits as told by your health care provider. This is important.  Contact a health care provider if you have:  · More pain, swelling, redness, or pus in your throat.  · A headache.  · Lack of energy (lethargy).  · A general feeling of illness (malaise).  · A fever.  · Dizziness.  · Trouble swallowing.  · Trouble eating.  · Signs of dehydration, such as:  ? Light-headedness when standing.  ? Urinating less than usual.  ? A fast heart rate.  ? Dry mouth.  Get help right away if you:  · Have trouble talking.  · Have trouble breathing, or it is easier for you to breathe when you lean forward.  · Cough up blood or vomit blood.  · Have severe throat pain that does not get better with medicine.  Summary  · A peritonsillar abscess is a collection of pus in the back of the throat. It usually occurs when an infection of the throat or tonsils spreads.  · Symptoms include a sore throat, difficulty swallowing, fever, chills, and occasional drooling.  · This condition is treated by draining the abscess and taking antibiotic medicine.  · Call your health care provider if you have trouble swallowing or eating after treatment.  · Get help right away if you vomit blood or cough up blood after you receive treatment.  This information is not intended to replace advice given to you by your health care provider. Make sure you discuss any questions you have with your health care provider.  Document Released: 12/18/2006 Document Revised: 11/30/2018 Document Reviewed: 11/01/2018  [employer] Patient Education © 2020 [employer]    Pharyngitis    Pharyngitis is redness, pain, and swelling (inflammation) of the throat (pharynx). It is a very common cause of sore throat. Pharyngitis can be caused by a bacteria, but it is usually caused by a virus. Most cases of  pharyngitis get better on their own without treatment.  What are the causes?  This condition may be caused by:  · Infection by viruses (viral). Viral pharyngitis spreads from person to person (is contagious) through coughing, sneezing, and sharing of personal items or utensils such as cups, forks, spoons, and toothbrushes.  · Infection by bacteria (bacterial). Bacterial pharyngitis may be spread by touching the nose or face after coming in contact with the bacteria, or through more intimate contact, such as kissing.  · Allergies. Allergies can cause buildup of mucus in the throat (post-nasal drip), leading to inflammation and irritation. Allergies can also cause blocked nasal passages, forcing breathing through the mouth, which dries and irritates the throat.  What increases the risk?  You are more likely to develop this condition if:  · You are 5-24 years old.  · You are exposed to crowded environments such as , school, or dormitory living.  · You live in a cold climate.  · You have a weakened disease-fighting (immune) system.  What are the signs or symptoms?  Symptoms of this condition vary by the cause (viral, bacterial, or allergies) and can include:  · Sore throat.  · Fatigue.  · Low-grade fever.  · Headache.  · Joint pain and muscle aches.  · Skin rashes.  · Swollen glands in the throat (lymph nodes).  · Plaque-like film on the throat or tonsils. This is often a symptom of bacterial pharyngitis.  · Vomiting.  · Stuffy nose (nasal congestion).  · Cough.  · Red, itchy eyes (conjunctivitis).  · Loss of appetite.  How is this diagnosed?  This condition is often diagnosed based on your medical history and a physical exam. Your health care provider will ask you questions about your illness and your symptoms. A swab of your throat may be done to check for bacteria (rapid strep test). Other lab tests may also be done, depending on the suspected cause, but these are rare.  How is this treated?  This condition  usually gets better in 3-4 days without medicine. Bacterial pharyngitis may be treated with antibiotic medicines.  Follow these instructions at home:  · Take over-the-counter and prescription medicines only as told by your health care provider.  ? If you were prescribed an antibiotic medicine, take it as told by your health care provider. Do not stop taking the antibiotic even if you start to feel better.  ? Do not give children aspirin because of the association with Reye syndrome.  · Drink enough water and fluids to keep your urine clear or pale yellow.  · Get a lot of rest.  · Gargle with a salt-water mixture 3-4 times a day or as needed. To make a salt-water mixture, completely dissolve ½-1 tsp of salt in 1 cup of warm water.  · If your health care provider approves, you may use throat lozenges or sprays to soothe your throat.  Contact a health care provider if:  · You have large, tender lumps in your neck.  · You have a rash.  · You cough up green, yellow-brown, or bloody spit.  Get help right away if:  · Your neck becomes stiff.  · You drool or are unable to swallow liquids.  · You cannot drink or take medicines without vomiting.  · You have severe pain that does not go away, even after you take medicine.  · You have trouble breathing, and it is not caused by a stuffy nose.  · You have new pain and swelling in your joints such as the knees, ankles, wrists, or elbows.  Summary  · Pharyngitis is redness, pain, and swelling (inflammation) of the throat (pharynx).  · While pharyngitis can be caused by a bacteria, the most common causes are viral.  · Most cases of pharyngitis get better on their own without treatment.  · Bacterial pharyngitis is treated with antibiotic medicines.  This information is not intended to replace advice given to you by your health care provider. Make sure you discuss any questions you have with your health care provider.  Document Released: 12/18/2006 Document Revised: 11/30/2018  Document Reviewed: 01/23/2018  [employer] Patient Education © 2020 [employer]

## 2021-04-04 ASSESSMENT — ENCOUNTER SYMPTOMS
FEVER: 0
SORE THROAT: 1
COUGH: 0

## 2021-04-05 ASSESSMENT — ENCOUNTER SYMPTOMS
WEAKNESS: 0
SPEECH CHANGE: 0
FOCAL WEAKNESS: 0
SENSORY CHANGE: 0
STRIDOR: 0

## 2022-10-13 ENCOUNTER — APPOINTMENT (OUTPATIENT)
Dept: OCCUPATIONAL MEDICINE | Facility: CLINIC | Age: 32
End: 2022-10-13

## 2022-10-13 ENCOUNTER — NON-PROVIDER VISIT (OUTPATIENT)
Dept: OCCUPATIONAL MEDICINE | Facility: CLINIC | Age: 32
End: 2022-10-13

## 2022-10-13 DIAGNOSIS — Z11.1 ENCOUNTER FOR PPD TEST: Primary | ICD-10-CM

## 2022-10-13 PROCEDURE — 86580 TB INTRADERMAL TEST: CPT | Performed by: NURSE PRACTITIONER

## 2022-10-15 ENCOUNTER — NON-PROVIDER VISIT (OUTPATIENT)
Dept: URGENT CARE | Facility: CLINIC | Age: 32
End: 2022-10-15

## 2022-10-15 LAB — TB WHEAL 3D P 5 TU DIAM: NORMAL MM

## 2023-02-03 ENCOUNTER — APPOINTMENT (OUTPATIENT)
Dept: RADIOLOGY | Facility: MEDICAL CENTER | Age: 33
End: 2023-02-03
Attending: STUDENT IN AN ORGANIZED HEALTH CARE EDUCATION/TRAINING PROGRAM
Payer: COMMERCIAL

## 2023-02-03 ENCOUNTER — HOSPITAL ENCOUNTER (EMERGENCY)
Facility: MEDICAL CENTER | Age: 33
End: 2023-02-03
Attending: STUDENT IN AN ORGANIZED HEALTH CARE EDUCATION/TRAINING PROGRAM
Payer: COMMERCIAL

## 2023-02-03 VITALS
OXYGEN SATURATION: 98 % | TEMPERATURE: 98.6 F | DIASTOLIC BLOOD PRESSURE: 79 MMHG | BODY MASS INDEX: 19.2 KG/M2 | HEART RATE: 67 BPM | SYSTOLIC BLOOD PRESSURE: 120 MMHG | WEIGHT: 105 LBS | RESPIRATION RATE: 14 BRPM

## 2023-02-03 DIAGNOSIS — R07.9 CHEST PAIN, UNSPECIFIED TYPE: ICD-10-CM

## 2023-02-03 DIAGNOSIS — N63.0 BREAST MASS IN FEMALE: ICD-10-CM

## 2023-02-03 LAB — EKG IMPRESSION: NORMAL

## 2023-02-03 PROCEDURE — 93005 ELECTROCARDIOGRAM TRACING: CPT

## 2023-02-03 PROCEDURE — 76604 US EXAM CHEST: CPT

## 2023-02-03 PROCEDURE — 93005 ELECTROCARDIOGRAM TRACING: CPT | Performed by: STUDENT IN AN ORGANIZED HEALTH CARE EDUCATION/TRAINING PROGRAM

## 2023-02-03 PROCEDURE — 99283 EMERGENCY DEPT VISIT LOW MDM: CPT

## 2023-02-04 NOTE — ED TRIAGE NOTES
"Chief Complaint   Patient presents with    Chest Pain     Yesterday, substernal.     Breast Mass     Reports that she found a breast lump a \"while ago\" in left breast. States that she thinks pain is coming from breast.    /82   Pulse 86   Temp 36.9 °C (98.4 °F) (Temporal)   Resp 18   Wt 47.6 kg (105 lb)   SpO2 100%   Pt informed of wait times. Educated on triage process.  Asked to return to triage RN for any new or worsening of symptoms. Thanked for patience.      "

## 2023-02-04 NOTE — ED NOTES
Pt sitting up in bed on cell phone with even and unlabored breaths. No distress noted at this time. Will continue to monitor pt while awaiting test results and further orders.

## 2023-02-04 NOTE — ED NOTES
Received report from ROSSANA Griffin. Upon shift change pt is resting in bed with even and unlabored breaths, in no apparent distress. Will continue to monitor.

## 2023-02-04 NOTE — ED PROVIDER NOTES
"ED Provider Note    CHIEF COMPLAINT  Chief Complaint   Patient presents with    Chest Pain     Yesterday, substernal.     Breast Mass     Reports that she found a breast lump a \"while ago\" in left breast. States that she thinks pain is coming from breast.        EXTERNAL RECORDS REVIEWED  Outpatient Notes urgent care note on 3/31/2021 for pharyngitis    HPI/ROS  LIMITATION TO HISTORY   Select: : None  OUTSIDE HISTORIAN(S):  None    Rosalva Kruger is a 32 y.o. female who presents with chest pain and breast lump.  Patient reports breast mass on her left breast a week ago.  Patient also reports left-sided parasternal chest pain which is constant, nonradiating, nonexertional, nonpleuritic without associated lightheadedness, diaphoresis, shortness of breath.  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  Patient denies discharge from her breast.  Patient denies swelling in her armpit.  Patient denies any known history of cancer.    PAST MEDICAL HISTORY       SURGICAL HISTORY  patient denies any surgical history    FAMILY HISTORY  No family history on file.    SOCIAL HISTORY  Social History     Tobacco Use    Smoking status: Never    Smokeless tobacco: Never   Vaping Use    Vaping Use: Not on file   Substance and Sexual Activity    Alcohol use: Yes     Comment: occasionally    Drug use: Not Currently     Types: Marijuana     Comment: marijuana - daily    Sexual activity: Yes     Partners: Female       CURRENT MEDICATIONS  Home Medications       Reviewed by Caitie Longoria R.N. (Registered Nurse) on 02/03/23 at 1835  Med List Status: Partial     Medication Last Dose Status   ibuprofen (MOTRIN) 600 MG Tab  Active   ibuprofen (MOTRIN) 600 MG Tab  Active                    ALLERGIES  No Known Allergies    PHYSICAL EXAM  VITAL SIGNS: /79   Pulse 67   Temp 37 °C (98.6 °F) (Temporal)   Resp 14   Wt 47.6 kg (105 lb)   SpO2 98%   BMI 19.20 kg/m²    Vitals and nursing note reviewed. "   Constitutional:       Comments: Patient is lying in bed supine, pleasant, conversant, speaking in complete sentences   HENT:      Head: Normocephalic and atraumatic.   Eyes:      Extraocular Movements: Extraocular movements intact.      Conjunctiva/sclera: Conjunctivae normal.      Pupils: Pupils are equal, round, and reactive to light.   Cardiovascular:      Pulses: Normal pulses.      Comments: HR 77  Pulmonary:      Effort: Pulmonary effort is normal. No respiratory distress.   Musculoskeletal:      Tenderness to palpation at the left sternocostal border at rib #4.  Left breast has a small mass which is tender, spherical, mobile, soft, no axillary lymphadenopathy or tenderness     General: No swelling. Normal range of motion.      Cervical back: Normal range of motion. No rigidity.   Skin:     General: Skin is warm and dry.      Capillary Refill: Capillary refill takes less than 2 seconds.   Neurological:      Mental Status: Alert.       DIAGNOSTIC STUDIES / PROCEDURES  EKG  I have independently interpreted this EKG  No STEMI    COURSE & MEDICAL DECISION MAKING        INITIAL ASSESSMENT, COURSE AND PLAN  Care Narrative: Ultrasound to evaluate for mass versus abscess versus hematoma versus other acute soft tissue process.  Cannot rule out abscess without imaging due to tenderness and circumferential date.  EKG unremarkable for ACS or arrhythmia.  Patient has no history of upper respiratory infection, myocarditis inconsistent with patient presentation at this time, no tachycardia, no fever.  Patient denies illicit sympathomimetic drug use, ACS inconsistent with patient presentation at this time.  Patient likely suffering from musculoskeletal pain.  Patient will be referred to the [facility], PCP referral for follow-up and imaging orders will be placed.     Electronically signed by: Chuy Bautista M.D., 2/3/2023 8:19 PM    Sign demonstrates no evidence of acute infection, abscess or  otherwise.  Patient has been given follow-up with PCP, breast center, outpatient ultrasound and instructions to return to the emergency department should she have any difficulty accessing outpatient care.    Repeat physical exam benign.  I doubt any serious emergency process at this time.  Patient and/or family, friends given strict return precautions and care instructions. They have demonstrated understanding of discharge instructions through teach back mechanism. Advised PCP follow-up in 1-2 days.  Patient/family/friend expresses understanding and agrees to plan.    This dictation has been created using voice recognition software. I am continuously working with the software to minimize the number of voice recognition errors and I have made every attempt to manually correct the errors within my dictation. However errors  related to this voice recognition software may still exist and should be interpreted within the appropriate context.     Electronically signed by: Chuy Bautista M.D., 2/4/2023 12:23 AM    -CAD Risk factor review:  No history of CAD, hyperlipidemia, diabetes.  hypertension.  tobacco. Denies methamphetamine and cocaine. Not obese.  Family history negative for early CAD.  -Aortic Dissection risk factors review: No sudden severe tearing pain. No radiation to the back. No neuro symptoms, No known aortic valvular abnormality or aortic aneurysm. No known connective tissue disorder.  -Pulmonary Embolism risk factor review:  No recent surgery, No unilateral lower extremity. No history of DVT or PE. Denies hemoptysis, denies current unilateral leg swelling..  No malignancy.  No BCP or hormone therapy.  No tachycardia or hypoxia.  -Other review: no fevers, IVDA, positional symptoms. no orthopnea or PND.  No abdominal pain to suggest emergency abdominal surgical process          ADDITIONAL PROBLEM LIST  Breast mass  Chest pain    DISPOSITION AND DISCUSSIONS      Escalation of care considered, and  ultimately not performed:IV fluids, blood analysis, diagnostic imaging, and acute inpatient care management, however at this time, the patient is most appropriate for outpatient management      FINAL DIAGNOSIS  1. Chest pain, unspecified type    2. Breast mass in female           Electronically signed by: Chuy Bautista M.D., 2/3/2023 8:07 PM

## 2023-03-09 ENCOUNTER — HOSPITAL ENCOUNTER (OUTPATIENT)
Dept: RADIOLOGY | Facility: MEDICAL CENTER | Age: 33
End: 2023-03-09
Payer: COMMERCIAL

## 2023-03-09 DIAGNOSIS — N63.0 BREAST MASS IN FEMALE: ICD-10-CM

## 2023-03-09 PROCEDURE — G0279 TOMOSYNTHESIS, MAMMO: HCPCS

## 2023-03-09 PROCEDURE — 76642 ULTRASOUND BREAST LIMITED: CPT | Mod: LT

## 2023-04-21 ENCOUNTER — TELEPHONE (OUTPATIENT)
Dept: URGENT CARE | Facility: CLINIC | Age: 33
End: 2023-04-21

## 2023-04-21 ENCOUNTER — OFFICE VISIT (OUTPATIENT)
Dept: URGENT CARE | Facility: CLINIC | Age: 33
End: 2023-04-21
Payer: COMMERCIAL

## 2023-04-21 VITALS
RESPIRATION RATE: 16 BRPM | DIASTOLIC BLOOD PRESSURE: 62 MMHG | OXYGEN SATURATION: 100 % | TEMPERATURE: 98.5 F | HEIGHT: 62 IN | WEIGHT: 115 LBS | SYSTOLIC BLOOD PRESSURE: 118 MMHG | BODY MASS INDEX: 21.16 KG/M2 | HEART RATE: 76 BPM

## 2023-04-21 DIAGNOSIS — H10.9 BACTERIAL CONJUNCTIVITIS OF BOTH EYES: ICD-10-CM

## 2023-04-21 DIAGNOSIS — B96.89 BACTERIAL CONJUNCTIVITIS OF BOTH EYES: ICD-10-CM

## 2023-04-21 PROCEDURE — 99213 OFFICE O/P EST LOW 20 MIN: CPT | Performed by: NURSE PRACTITIONER

## 2023-04-21 RX ORDER — CIPROFLOXACIN HYDROCHLORIDE 3.5 MG/ML
2 SOLUTION/ DROPS TOPICAL 4 TIMES DAILY
Qty: 3 ML | Refills: 0 | Status: SHIPPED | OUTPATIENT
Start: 2023-04-21 | End: 2023-04-26

## 2023-04-21 RX ORDER — OFLOXACIN 3 MG/ML
SOLUTION/ DROPS OPHTHALMIC
COMMUNITY
Start: 2023-03-31 | End: 2023-04-21

## 2023-04-21 RX ORDER — OFLOXACIN 3 MG/ML
1 SOLUTION/ DROPS OPHTHALMIC 4 TIMES DAILY
Qty: 5 ML | Refills: 0 | Status: SHIPPED | OUTPATIENT
Start: 2023-04-21 | End: 2023-04-21

## 2023-04-21 ASSESSMENT — VISUAL ACUITY: OU: 1

## 2023-04-21 NOTE — PROGRESS NOTES
"Patient has consented to treatment and for use of patient information for treatment and billing purposes.    Date: 04/21/23     Arrival Mode: Private Vehicle    Chief Complaint:    Chief Complaint   Patient presents with    Eye Problem     X1wk, both eyes red and goopy, draining        History of Present Illness: 32 y.o.  female presents to clinic with 1 to 2 weeks of bilateral eye redness and drainage.  Patient states she does work at a  and pinkeye has been circulating around the clinic.  She reports mild itching and irritation.  She does awake with mucoid discharge although her eyes are not sealed shut.  She was using over-the-counter \"pinkeye \"drops.  She did have a telehealth visit where they advised that she stopped using these drops.  She has not used these drops in several days.  She has been using over-the-counter allergy eyedrops which she states is mildly helpful.  These drops are also homeopathic.  She does not wear contacts she denies any vision changes she denies any eye trauma.  She has never had allergic conjunctivitis prior.  She denies any recent illnesses.    ROS:    As stated in HPI     Pertinent Medical History:  No past medical history on file.     Pertinent Surgical History:  No past surgical history on file.     Pertinent Medications:    No current outpatient medications on file prior to visit.     No current facility-administered medications on file prior to visit.        Allergies:    Patient has no known allergies.     Social History:  Social History     Tobacco Use    Smoking status: Never    Smokeless tobacco: Never   Vaping Use    Vaping Use: Never used   Substance Use Topics    Alcohol use: Yes     Comment: occasionally    Drug use: Not Currently     Types: Marijuana     Comment: marijuana - daily        No LMP recorded.           Physical Exam:    Vitals:    04/21/23 0835   BP: 118/62   Pulse: 76   Resp: 16   Temp: 36.9 °C (98.5 °F)   SpO2: 100%             Physical " Exam  Constitutional:       Appearance: Normal appearance.   HENT:      Head: Normocephalic and atraumatic.      Nose: Nose normal.      Mouth/Throat:      Mouth: Mucous membranes are moist.   Eyes:      General: Lids are normal. Lids are everted, no foreign bodies appreciated. Vision grossly intact. Gaze aligned appropriately. No allergic shiner or scleral icterus.        Right eye: Discharge present. No foreign body.         Left eye: Discharge present.No foreign body.      Extraocular Movements: Extraocular movements intact.      Conjunctiva/sclera:      Right eye: Right conjunctiva is injected. Exudate present. No chemosis or hemorrhage.     Left eye: Left conjunctiva is injected. Exudate present. No chemosis or hemorrhage.     Pupils: Pupils are equal, round, and reactive to light.   Neurological:      Mental Status: She is alert.          Diagnostics:      None  Medical Decision making and clinic course :  I personally reviewed prior external notes and test results pertinent to today's visit. Pt is clinically stable at today's acute urgent care visit.  No acute distress noted. Appropriate for outpatient care at this time. Shared decision-making was utilized with patient for treatment plan.    Pleasant nontoxic-appearing 5-year-old female presenting to clinic with bilateral eye conjunctivitis.  Discussed differentials of bacterial, viral as well as allergic.  Advised patient to stop using over-the-counter homeopathic eyedrops.  Due to the longevity of symptoms out of caution we will send for ciprofloxacin eyedrop to treat for bacterial conjunctivitis.  Although did discuss with patient I do suspect likely allergic conjunctivitis.  Did recommend daily allergy medication patient is currently taking Zyrtec recommend switching to Claritin to see if it helps.  Did also discuss appropriate over-the-counter moisturizing drops.  Did not recommend red eye clearing drops.  Advised that patient follow-up with  ophthalmology if symptoms or not improving.    The patient remained stable during the urgent care visit.    Plan:    Medication discussed included indication for use and the potential benefits and side effects.    1. Bacterial conjunctivitis of both eyes    - ciprofloxacin (CILOXIN) 0.3 % Solution; Administer 2 Drops into both eyes 4 times a day for 7 days.  Dispense: 3 mL; Refill: 0       All of the patient's questions were answered to their satisfaction at the time of discharge.    Follow up:    Recommended f/u in  3 days  if there is no improvement.    Patient was encouraged to monitor symptoms closely. Those signs and symptoms which would warrant concern and mandate seeking a higher level of service through the emergency department discussed at length and included in discharge papers.  Patient stated agreement and understanding of this plan of care.    Disposition:  Home in stable condition       Voice Recognition Disclaimer:  Portions of this document were created using voice recognition software. The software does have a chance of producing errors of grammar and possibly content. I have made every reasonable attempt to correct obvious errors, but there may be errors of grammar and possibly content that I did not discover before finalizing the documentation.    Re Elmore, A.P.R.N.

## 2023-04-21 NOTE — TELEPHONE ENCOUNTER
Hello , patient called and would like medication changed to either maxitrol or ciprofloxacin   [facility] [address]     Please advise,   Thank you.

## 2023-04-26 ENCOUNTER — OFFICE VISIT (OUTPATIENT)
Dept: URGENT CARE | Facility: CLINIC | Age: 33
End: 2023-04-26
Payer: COMMERCIAL

## 2023-04-26 VITALS
WEIGHT: 115 LBS | OXYGEN SATURATION: 99 % | BODY MASS INDEX: 21.16 KG/M2 | SYSTOLIC BLOOD PRESSURE: 102 MMHG | HEIGHT: 62 IN | DIASTOLIC BLOOD PRESSURE: 68 MMHG | TEMPERATURE: 99.5 F | HEART RATE: 93 BPM | RESPIRATION RATE: 14 BRPM

## 2023-04-26 DIAGNOSIS — H10.13 ALLERGIC CONJUNCTIVITIS OF BOTH EYES: ICD-10-CM

## 2023-04-26 DIAGNOSIS — K04.7 DENTAL INFECTION: ICD-10-CM

## 2023-04-26 PROBLEM — L98.9 SKIN LESION: Status: ACTIVE | Noted: 2021-06-23

## 2023-04-26 PROCEDURE — 99213 OFFICE O/P EST LOW 20 MIN: CPT | Performed by: PHYSICIAN ASSISTANT

## 2023-04-26 RX ORDER — AMOXICILLIN AND CLAVULANATE POTASSIUM 875; 125 MG/1; MG/1
1 TABLET, FILM COATED ORAL 2 TIMES DAILY
Qty: 14 TABLET | Refills: 0 | Status: SHIPPED | OUTPATIENT
Start: 2023-04-26 | End: 2023-05-03

## 2023-04-26 RX ORDER — OLOPATADINE HYDROCHLORIDE 1 MG/ML
1 SOLUTION/ DROPS OPHTHALMIC 2 TIMES DAILY
Qty: 5 ML | Refills: 0 | Status: SHIPPED | OUTPATIENT
Start: 2023-04-26

## 2023-04-26 ASSESSMENT — ENCOUNTER SYMPTOMS
COUGH: 0
DIARRHEA: 0
DIZZINESS: 0
SORE THROAT: 0
ABDOMINAL PAIN: 0
DIAPHORESIS: 0
HEADACHES: 0
NAUSEA: 0
EYE PAIN: 0
SINUS PAIN: 0
EYE DISCHARGE: 1
VOMITING: 0
FEVER: 0
SHORTNESS OF BREATH: 0
CHILLS: 0
WHEEZING: 0
EYE REDNESS: 1
CONSTIPATION: 0

## 2023-04-27 NOTE — PROGRESS NOTES
"  Subjective:     Rosalva Kruger  is a 32 y.o. female who presents for Tooth Abscess (Tooth abscess x 1 month )       She presents today with right upper dental maxillary abscess has been ongoing for the past month.  Patient states that the abscess is on the tooth and has had a previous root canal and crown so it is nonpainful but she does have a bump on the external surface of the maxillary gingiva.  No fever/chills/sweats, no pain with chewing, no jaw malocclusion.  She has not seen a dental professional for this    She also presents today with ongoing bilateral eye conjunctival injection and watery discharge.  No purulent discharge, no change in vision, no blurry vision, no pain with EOMs, no orbital pain noted.  She has taken 2 rounds of antibiotic eyedrops for suspected pinkeye, without symptom improvement.     Review of Systems   Constitutional:  Negative for chills, diaphoresis, fever and malaise/fatigue.   HENT:  Negative for congestion, ear discharge, sinus pain and sore throat.         Right upper dental maxillary abscess   Eyes:  Positive for discharge (Clear) and redness. Negative for pain.   Respiratory:  Negative for cough, shortness of breath and wheezing.    Cardiovascular:  Negative for chest pain.   Gastrointestinal:  Negative for abdominal pain, constipation, diarrhea, nausea and vomiting.   Genitourinary:  Negative for dysuria, frequency and urgency.   Neurological:  Negative for dizziness and headaches.    No Known Allergies  History reviewed. No pertinent past medical history.     Objective:   /68 (BP Location: Left arm, Patient Position: Sitting, BP Cuff Size: Adult)   Pulse 93   Temp 37.5 °C (99.5 °F) (Temporal)   Resp 14   Ht 1.575 m (5' 2\")   Wt 52.2 kg (115 lb)   SpO2 99%   BMI 21.03 kg/m²   Physical Exam  Vitals and nursing note reviewed.   Constitutional:       General: She is not in acute distress.     Appearance: Normal appearance. She is not ill-appearing, toxic-appearing " or diaphoretic.   HENT:      Head: Normocephalic.      Right Ear: Tympanic membrane, ear canal and external ear normal. There is no impacted cerumen.      Left Ear: Tympanic membrane, ear canal and external ear normal. There is no impacted cerumen.      Nose: No congestion or rhinorrhea.      Mouth/Throat:      Mouth: Mucous membranes are moist.      Pharynx: No oropharyngeal exudate or posterior oropharyngeal erythema.      Comments: Internal examination of the mouth does reveal a dental abscess over the right upper maxillary molar with a fluctuant drainage fistula on the external surface of the maxillary gingiva.  Area is nonpainful to palpation.  Eyes:      General:         Right eye: Discharge (Clear watery) present.         Left eye: Discharge (Clear watery) present.     Extraocular Movements: Extraocular movements intact.      Pupils: Pupils are equal, round, and reactive to light.      Comments: Bilateral eye conjunctival injection   Cardiovascular:      Rate and Rhythm: Normal rate and regular rhythm.   Pulmonary:      Effort: Pulmonary effort is normal. No respiratory distress.      Breath sounds: Normal breath sounds. No stridor. No wheezing or rhonchi.   Musculoskeletal:      Cervical back: Neck supple.   Lymphadenopathy:      Cervical: No cervical adenopathy.   Neurological:      General: No focal deficit present.      Mental Status: She is alert and oriented to person, place, and time.   Psychiatric:         Mood and Affect: Mood normal.         Behavior: Behavior normal.         Thought Content: Thought content normal.         Judgment: Judgment normal.           Diagnostic testing: None    Assessment/Plan:     Encounter Diagnoses   Name Primary?   • Dental infection    • Allergic conjunctivitis of both eyes           Plan for care for today's complaint includes allergic conjunctivitis eyedrops as patient has already previously tried to different antibiotic eyedrops for suspected pinkeye.  Based on  symptom presentation and symptom duration I do feel that the are either allergy or viral cause for her conjunctival injection.  There is evidence of a dental infection on exam today, discussed with the patient to follow-up with a dental professional for this condition.  Augmentin for antibiotic treatment for dental abscess.  Continue to monitor symptoms and return to urgent care or follow-up with primary care provider if symptoms remain ongoing.  Follow-up in the emergency department if symptoms become severe, ER precautions discussed in office today..  Prescription for Augmentin, allergy eyedrops provided.    See AVS Instructions below for written guidance provided to patient on after-visit management and care in addition to our verbal discussion during the visit.    Please note that this dictation was created using voice recognition software. I have attempted to correct all errors, but there may be sound-alike, spelling, grammar and possibly content errors that I did not discover before finalizing the note.    Ventura Sanchez PA-C

## 2024-07-19 ENCOUNTER — NON-PROVIDER VISIT (OUTPATIENT)
Dept: OCCUPATIONAL MEDICINE | Facility: CLINIC | Age: 34
End: 2024-07-19

## 2024-07-19 DIAGNOSIS — Z02.89 EXAMINATION, PHYSICAL, EMPLOYEE: Primary | ICD-10-CM

## 2024-07-19 PROCEDURE — 80305 DRUG TEST PRSMV DIR OPT OBS: CPT | Performed by: PREVENTIVE MEDICINE

## 2024-07-19 PROCEDURE — 8911 PR MRO FEE: Performed by: NURSE PRACTITIONER

## 2024-10-14 ENCOUNTER — NON-PROVIDER VISIT (OUTPATIENT)
Dept: OCCUPATIONAL MEDICINE | Facility: CLINIC | Age: 34
End: 2024-10-14

## 2024-10-14 DIAGNOSIS — Z11.1 ENCOUNTER FOR PPD TEST: ICD-10-CM

## 2024-10-14 PROCEDURE — 86580 TB INTRADERMAL TEST: CPT | Performed by: NURSE PRACTITIONER

## 2024-10-29 ENCOUNTER — NON-PROVIDER VISIT (OUTPATIENT)
Dept: OCCUPATIONAL MEDICINE | Facility: CLINIC | Age: 34
End: 2024-10-29

## 2024-10-29 DIAGNOSIS — Z11.1 ENCOUNTER FOR PPD TEST: Primary | ICD-10-CM

## 2024-10-31 ENCOUNTER — NON-PROVIDER VISIT (OUTPATIENT)
Dept: OCCUPATIONAL MEDICINE | Facility: CLINIC | Age: 34
End: 2024-10-31

## 2024-10-31 DIAGNOSIS — Z11.1 ENCOUNTER FOR PPD SKIN TEST READING: ICD-10-CM

## 2024-10-31 LAB — TB WHEAL 3D P 5 TU DIAM: NORMAL MM

## 2025-02-17 ENCOUNTER — APPOINTMENT (OUTPATIENT)
Dept: URGENT CARE | Facility: CLINIC | Age: 35
End: 2025-02-17
Payer: COMMERCIAL